# Patient Record
Sex: MALE | Race: WHITE | Employment: OTHER | URBAN - NONMETROPOLITAN AREA
[De-identification: names, ages, dates, MRNs, and addresses within clinical notes are randomized per-mention and may not be internally consistent; named-entity substitution may affect disease eponyms.]

---

## 2019-09-24 ENCOUNTER — OFFICE VISIT (OUTPATIENT)
Dept: PRIMARY CARE CLINIC | Age: 69
End: 2019-09-24
Payer: MEDICARE

## 2019-09-24 ENCOUNTER — HOSPITAL ENCOUNTER (OUTPATIENT)
Age: 69
Setting detail: SPECIMEN
Discharge: HOME | End: 2019-09-24
Payer: MEDICARE

## 2019-09-24 VITALS
WEIGHT: 229 LBS | DIASTOLIC BLOOD PRESSURE: 74 MMHG | SYSTOLIC BLOOD PRESSURE: 124 MMHG | OXYGEN SATURATION: 98 % | TEMPERATURE: 98 F | HEART RATE: 64 BPM

## 2019-09-24 DIAGNOSIS — A49.9 UTI (URINARY TRACT INFECTION), BACTERIAL: Primary | ICD-10-CM

## 2019-09-24 DIAGNOSIS — R30.0 DYSURIA: ICD-10-CM

## 2019-09-24 DIAGNOSIS — N39.0 UTI (URINARY TRACT INFECTION), BACTERIAL: Primary | ICD-10-CM

## 2019-09-24 PROBLEM — Z72.0 TOBACCO USE: Status: ACTIVE | Noted: 2019-09-24

## 2019-09-24 LAB
-: ABNORMAL
AMORPHOUS: ABNORMAL
BACTERIA: ABNORMAL
BILIRUBIN URINE: NEGATIVE
CASTS UA: ABNORMAL /LPF (ref 0–2)
COLOR: ABNORMAL
COMMENT UA: ABNORMAL
CRYSTALS, UA: ABNORMAL /HPF
EPITHELIAL CELLS UA: ABNORMAL /HPF (ref 0–5)
GLUCOSE URINE: NEGATIVE
KETONES, URINE: ABNORMAL
LEUKOCYTE ESTERASE, URINE: ABNORMAL
MUCUS: ABNORMAL
NITRITE, URINE: POSITIVE
OTHER OBSERVATIONS UA: ABNORMAL
PH UA: 6.5 (ref 5–6)
PROTEIN UA: NEGATIVE
RBC UA: ABNORMAL /HPF (ref 0–4)
RENAL EPITHELIAL, UA: ABNORMAL /HPF
SPECIFIC GRAVITY UA: 1.01 (ref 1.01–1.02)
TRICHOMONAS: ABNORMAL
TURBIDITY: ABNORMAL
URINE HGB: NEGATIVE
UROBILINOGEN, URINE: NORMAL
WBC UA: >50 /HPF (ref 0–4)
YEAST: ABNORMAL

## 2019-09-24 PROCEDURE — 87077 CULTURE AEROBIC IDENTIFY: CPT

## 2019-09-24 PROCEDURE — 81001 URINALYSIS AUTO W/SCOPE: CPT

## 2019-09-24 PROCEDURE — 99203 OFFICE O/P NEW LOW 30 MIN: CPT | Performed by: FAMILY MEDICINE

## 2019-09-24 PROCEDURE — 87086 URINE CULTURE/COLONY COUNT: CPT

## 2019-09-24 RX ORDER — CIPROFLOXACIN 500 MG/1
500 TABLET, FILM COATED ORAL 2 TIMES DAILY
Qty: 14 TABLET | Refills: 0 | Status: SHIPPED | OUTPATIENT
Start: 2019-09-24 | End: 2019-09-27

## 2019-09-24 NOTE — PROGRESS NOTES
09/24/2019 POSITIVE* NEGATIVE Final    Leukocyte Esterase, Urine 09/24/2019 2+* NEGATIVE Final    Urinalysis Comments 09/24/2019 NOT REPORTED   Final    - 09/24/2019        Final    WBC, UA 09/24/2019 >50  0 - 4 /HPF Final    RBC, UA 09/24/2019 0 TO 4  0 - 4 /HPF Final    Casts UA 09/24/2019 NOT REPORTED  0 - 2 /LPF Final    Crystals UA 09/24/2019 NOT REPORTED  None /HPF Final    Epithelial Cells UA 09/24/2019 0 TO 4  0 - 5 /HPF Final    Renal Epithelial, Urine 09/24/2019 NOT REPORTED  0 /HPF Final    Bacteria, UA 09/24/2019 3+* None Final    Mucus, UA 09/24/2019 1+* None Final    Trichomonas, UA 09/24/2019 NOT REPORTED  None Final    Amorphous, UA 09/24/2019 2+* None Final    Other Observations UA 09/24/2019 Specimen Cultured* NOT REQ. Final    Yeast, UA 09/24/2019 NOT REPORTED  None Final       Assessment and Plan:    UTI (urinary tract infection), bacterial  - ciprofloxacin (CIPRO) 500 MG tablet; Take 1 tablet by mouth 2 times daily for 7 days  Dispense: 14 tablet; Refill: 0     He will be contacted when the ID and sensitivity results are available. Printed information regarding Urinary Tract Infections in Men was provided to the patient with his after visit summary. He was advised to follow up if symptoms worsen or do not resolve.        (Please note that portions of this note were completed with a voice-recognition program. Efforts were made to edit the dictation but occasionally words are mis-transcribed.)

## 2019-09-24 NOTE — PATIENT INSTRUCTIONS
Patient Education        Urinary Tract Infections in Men: Care Instructions  Your Care Instructions    A urinary tract infection, or UTI, is a general term for an infection anywhere between the kidneys and the tip of the penis. UTIs can also be a result of a prostate problem. Most cause pain or burning when you urinate. Most UTIs are caused by bacteria and can be cured with antibiotics. It is important to complete your treatment so that the infection does not get worse. Follow-up care is a key part of your treatment and safety. Be sure to make and go to all appointments, and call your doctor if you are having problems. It's also a good idea to know your test results and keep a list of the medicines you take. How can you care for yourself at home? · Take your antibiotics as prescribed. Do not stop taking them just because you feel better. You need to take the full course of antibiotics. · Take your medicines exactly as prescribed. Your doctor may have prescribed a medicine, such as phenazopyridine (Pyridium), to help relieve pain when you urinate. This turns your urine orange. You may stop taking it when your symptoms get better. But be sure to take all of your antibiotics, which treat the infection. · Drink extra water for the next day or two. This will help make the urine less concentrated and help wash out the bacteria causing the infection. (If you have kidney, heart, or liver disease and have to limit your fluids, talk with your doctor before you increase your fluid intake.)  · Avoid drinks that are carbonated or have caffeine. They can irritate the bladder. · Urinate often. Try to empty your bladder each time. · To relieve pain, take a hot bath or lay a heating pad (set on low) over your lower belly or genital area. Never go to sleep with a heating pad in place. To help prevent UTIs  · Drink plenty of fluids, enough so that your urine is light yellow or clear like water.  If you have kidney, heart, or

## 2019-09-26 LAB
CULTURE: ABNORMAL
Lab: ABNORMAL
SPECIMEN DESCRIPTION: ABNORMAL

## 2019-09-27 RX ORDER — AMOXICILLIN 500 MG/1
500 CAPSULE ORAL 3 TIMES DAILY
Qty: 21 CAPSULE | Refills: 0 | Status: SHIPPED | OUTPATIENT
Start: 2019-09-27 | End: 2019-10-04

## 2019-10-11 ENCOUNTER — OFFICE VISIT (OUTPATIENT)
Dept: PRIMARY CARE CLINIC | Age: 69
End: 2019-10-11
Payer: MEDICARE

## 2019-10-11 VITALS
DIASTOLIC BLOOD PRESSURE: 80 MMHG | TEMPERATURE: 97.3 F | HEIGHT: 72 IN | WEIGHT: 233 LBS | SYSTOLIC BLOOD PRESSURE: 110 MMHG | OXYGEN SATURATION: 97 % | HEART RATE: 60 BPM | BODY MASS INDEX: 31.56 KG/M2

## 2019-10-11 DIAGNOSIS — J44.1 COPD EXACERBATION (HCC): Primary | ICD-10-CM

## 2019-10-11 PROCEDURE — 99214 OFFICE O/P EST MOD 30 MIN: CPT | Performed by: FAMILY MEDICINE

## 2019-10-11 RX ORDER — DOXYCYCLINE HYCLATE 100 MG
100 TABLET ORAL 2 TIMES DAILY
Qty: 20 TABLET | Refills: 0 | Status: SHIPPED | OUTPATIENT
Start: 2019-10-11 | End: 2019-12-20

## 2019-10-11 RX ORDER — PREDNISONE 20 MG/1
20 TABLET ORAL 2 TIMES DAILY
Qty: 10 TABLET | Refills: 0 | Status: SHIPPED | OUTPATIENT
Start: 2019-10-11 | End: 2019-12-20

## 2019-10-11 ASSESSMENT — ENCOUNTER SYMPTOMS
WHEEZING: 1
DIARRHEA: 0
COUGH: 1
SORE THROAT: 1
NAUSEA: 0
SINUS PRESSURE: 0
SINUS PAIN: 1
SHORTNESS OF BREATH: 0
ABDOMINAL PAIN: 0

## 2019-12-20 ENCOUNTER — OFFICE VISIT (OUTPATIENT)
Dept: PRIMARY CARE CLINIC | Age: 69
End: 2019-12-20
Payer: MEDICARE

## 2019-12-20 ENCOUNTER — HOSPITAL ENCOUNTER (OUTPATIENT)
Age: 69
Setting detail: SPECIMEN
Discharge: HOME | End: 2019-12-20
Payer: MEDICARE

## 2019-12-20 ENCOUNTER — HOSPITAL ENCOUNTER (OUTPATIENT)
Dept: LAB | Age: 69
Discharge: HOME | End: 2019-12-20
Payer: MEDICARE

## 2019-12-20 ENCOUNTER — TELEPHONE (OUTPATIENT)
Dept: PRIMARY CARE CLINIC | Age: 69
End: 2019-12-20

## 2019-12-20 VITALS
SYSTOLIC BLOOD PRESSURE: 122 MMHG | HEART RATE: 56 BPM | BODY MASS INDEX: 31.69 KG/M2 | WEIGHT: 234 LBS | HEIGHT: 72 IN | DIASTOLIC BLOOD PRESSURE: 78 MMHG | OXYGEN SATURATION: 99 % | RESPIRATION RATE: 14 BRPM

## 2019-12-20 DIAGNOSIS — R35.0 URINARY FREQUENCY: Primary | ICD-10-CM

## 2019-12-20 DIAGNOSIS — R35.0 URINARY FREQUENCY: ICD-10-CM

## 2019-12-20 DIAGNOSIS — R39.9 URINARY SYMPTOM OR SIGN: ICD-10-CM

## 2019-12-20 DIAGNOSIS — N30.00 ACUTE CYSTITIS WITHOUT HEMATURIA: ICD-10-CM

## 2019-12-20 DIAGNOSIS — Z12.5 SCREENING FOR PROSTATE CANCER: ICD-10-CM

## 2019-12-20 LAB
-: ABNORMAL
AMORPHOUS: ABNORMAL
BACTERIA: ABNORMAL
BILIRUBIN URINE: NEGATIVE
CASTS UA: ABNORMAL /LPF (ref 0–2)
COLOR: ABNORMAL
COMMENT UA: ABNORMAL
CRYSTALS, UA: ABNORMAL /HPF
EPITHELIAL CELLS UA: ABNORMAL /HPF (ref 0–5)
GLUCOSE URINE: NEGATIVE
KETONES, URINE: NEGATIVE
LEUKOCYTE ESTERASE, URINE: ABNORMAL
MUCUS: ABNORMAL
NITRITE, URINE: NEGATIVE
OTHER OBSERVATIONS UA: ABNORMAL
PH UA: 6 (ref 5–6)
PROSTATE SPECIFIC ANTIGEN: 0.08 UG/L
PROTEIN UA: NEGATIVE
RBC UA: ABNORMAL /HPF (ref 0–4)
RENAL EPITHELIAL, UA: ABNORMAL /HPF
SPECIFIC GRAVITY UA: 1.03 (ref 1.01–1.02)
TRICHOMONAS: ABNORMAL
TURBIDITY: ABNORMAL
URINE HGB: NEGATIVE
UROBILINOGEN, URINE: NORMAL
WBC UA: >100 /HPF (ref 0–4)
YEAST: ABNORMAL

## 2019-12-20 PROCEDURE — 99213 OFFICE O/P EST LOW 20 MIN: CPT | Performed by: PHYSICIAN ASSISTANT

## 2019-12-20 PROCEDURE — 87077 CULTURE AEROBIC IDENTIFY: CPT

## 2019-12-20 PROCEDURE — G0103 PSA SCREENING: HCPCS

## 2019-12-20 PROCEDURE — 36415 COLL VENOUS BLD VENIPUNCTURE: CPT

## 2019-12-20 PROCEDURE — 81001 URINALYSIS AUTO W/SCOPE: CPT

## 2019-12-20 PROCEDURE — 87086 URINE CULTURE/COLONY COUNT: CPT

## 2019-12-20 RX ORDER — CIPROFLOXACIN 500 MG/1
500 TABLET, FILM COATED ORAL 2 TIMES DAILY
Qty: 10 TABLET | Refills: 0 | Status: SHIPPED | OUTPATIENT
Start: 2019-12-20 | End: 2020-01-08

## 2019-12-20 ASSESSMENT — ENCOUNTER SYMPTOMS
DIARRHEA: 0
BACK PAIN: 1
NAUSEA: 1

## 2019-12-20 ASSESSMENT — PATIENT HEALTH QUESTIONNAIRE - PHQ9
2. FEELING DOWN, DEPRESSED OR HOPELESS: 0
1. LITTLE INTEREST OR PLEASURE IN DOING THINGS: 0
SUM OF ALL RESPONSES TO PHQ9 QUESTIONS 1 & 2: 0
SUM OF ALL RESPONSES TO PHQ QUESTIONS 1-9: 0
SUM OF ALL RESPONSES TO PHQ QUESTIONS 1-9: 0

## 2019-12-22 LAB
CULTURE: ABNORMAL
Lab: ABNORMAL
SPECIMEN DESCRIPTION: ABNORMAL

## 2019-12-23 DIAGNOSIS — N30.00 ACUTE CYSTITIS WITHOUT HEMATURIA: Primary | ICD-10-CM

## 2019-12-23 RX ORDER — NITROFURANTOIN 25; 75 MG/1; MG/1
100 CAPSULE ORAL 2 TIMES DAILY
Qty: 20 CAPSULE | Refills: 0 | Status: SHIPPED | OUTPATIENT
Start: 2019-12-23 | End: 2020-01-02

## 2020-01-01 ENCOUNTER — TELEPHONE (OUTPATIENT)
Dept: SLEEP CENTER | Age: 70
End: 2020-01-01

## 2020-01-01 ENCOUNTER — HOSPITAL ENCOUNTER (OUTPATIENT)
Dept: PHYSICAL THERAPY | Age: 70
Setting detail: THERAPIES SERIES
Discharge: HOME | End: 2020-03-03
Payer: MEDICARE

## 2020-01-01 ENCOUNTER — OFFICE VISIT (OUTPATIENT)
Dept: INTERNAL MEDICINE | Age: 70
End: 2020-01-01
Payer: MEDICARE

## 2020-01-01 ENCOUNTER — TELEPHONE (OUTPATIENT)
Dept: INTERNAL MEDICINE | Age: 70
End: 2020-01-01

## 2020-01-01 ENCOUNTER — HOSPITAL ENCOUNTER (OUTPATIENT)
Dept: PHYSICAL THERAPY | Age: 70
Setting detail: THERAPIES SERIES
Discharge: HOME | End: 2020-03-10
Payer: MEDICARE

## 2020-01-01 ENCOUNTER — HOSPITAL ENCOUNTER (OUTPATIENT)
Dept: LAB | Age: 70
Discharge: HOME | End: 2020-03-12
Payer: MEDICARE

## 2020-01-01 ENCOUNTER — HOSPITAL ENCOUNTER (OUTPATIENT)
Dept: GENERAL RADIOLOGY | Age: 70
Discharge: HOME | End: 2020-03-01
Payer: MEDICARE

## 2020-01-01 ENCOUNTER — TELEPHONE (OUTPATIENT)
Dept: SURGERY | Age: 70
End: 2020-01-01

## 2020-01-01 ENCOUNTER — HOSPITAL ENCOUNTER (OUTPATIENT)
Dept: PHYSICAL THERAPY | Age: 70
Setting detail: THERAPIES SERIES
Discharge: HOME | End: 2020-03-12
Payer: MEDICARE

## 2020-01-01 ENCOUNTER — HOSPITAL ENCOUNTER (OUTPATIENT)
Dept: PHYSICAL THERAPY | Age: 70
Setting detail: THERAPIES SERIES
Discharge: HOME | End: 2020-03-06
Payer: MEDICARE

## 2020-01-01 ENCOUNTER — HOSPITAL ENCOUNTER (OUTPATIENT)
Dept: PHYSICAL THERAPY | Age: 70
Setting detail: THERAPIES SERIES
Discharge: HOME | End: 2020-03-19
Payer: MEDICARE

## 2020-01-01 ENCOUNTER — TELEPHONE (OUTPATIENT)
Dept: PHYSICAL THERAPY | Age: 70
End: 2020-01-01

## 2020-01-01 ENCOUNTER — OFFICE VISIT (OUTPATIENT)
Dept: UROLOGY | Age: 70
End: 2020-01-01
Payer: MEDICARE

## 2020-01-01 ENCOUNTER — ANESTHESIA (OUTPATIENT)
Dept: OPERATING ROOM | Age: 70
End: 2020-01-01
Payer: MEDICARE

## 2020-01-01 ENCOUNTER — HOSPITAL ENCOUNTER (OUTPATIENT)
Dept: MRI IMAGING | Age: 70
Discharge: HOME | End: 2020-03-06
Payer: MEDICARE

## 2020-01-01 ENCOUNTER — TELEPHONE (OUTPATIENT)
Dept: PAIN MANAGEMENT | Age: 70
End: 2020-01-01

## 2020-01-01 ENCOUNTER — TELEPHONE (OUTPATIENT)
Dept: PHARMACY | Facility: CLINIC | Age: 70
End: 2020-01-01

## 2020-01-01 ENCOUNTER — OFFICE VISIT (OUTPATIENT)
Dept: ORTHOPEDIC SURGERY | Age: 70
End: 2020-01-01
Payer: MEDICARE

## 2020-01-01 ENCOUNTER — OFFICE VISIT (OUTPATIENT)
Dept: PAIN MANAGEMENT | Age: 70
End: 2020-01-01
Payer: MEDICARE

## 2020-01-01 ENCOUNTER — APPOINTMENT (OUTPATIENT)
Dept: PHYSICAL THERAPY | Age: 70
End: 2020-01-01
Payer: MEDICARE

## 2020-01-01 ENCOUNTER — OFFICE VISIT (OUTPATIENT)
Dept: CARDIOLOGY | Age: 70
End: 2020-01-01
Payer: MEDICARE

## 2020-01-01 ENCOUNTER — HOSPITAL ENCOUNTER (OUTPATIENT)
Dept: PHYSICAL THERAPY | Age: 70
Setting detail: THERAPIES SERIES
Discharge: HOME | End: 2020-03-16
Payer: MEDICARE

## 2020-01-01 ENCOUNTER — ANESTHESIA EVENT (OUTPATIENT)
Dept: OPERATING ROOM | Age: 70
End: 2020-01-01
Payer: MEDICARE

## 2020-01-01 ENCOUNTER — HOSPITAL ENCOUNTER (OUTPATIENT)
Age: 70
Setting detail: OUTPATIENT SURGERY
Discharge: HOME | End: 2020-03-18
Attending: SURGERY | Admitting: SURGERY
Payer: MEDICARE

## 2020-01-01 VITALS
HEART RATE: 78 BPM | TEMPERATURE: 97.1 F | DIASTOLIC BLOOD PRESSURE: 76 MMHG | SYSTOLIC BLOOD PRESSURE: 116 MMHG | RESPIRATION RATE: 16 BRPM | BODY MASS INDEX: 29.12 KG/M2 | HEIGHT: 72 IN | WEIGHT: 215 LBS

## 2020-01-01 VITALS
DIASTOLIC BLOOD PRESSURE: 68 MMHG | RESPIRATION RATE: 16 BRPM | BODY MASS INDEX: 29.42 KG/M2 | HEART RATE: 76 BPM | SYSTOLIC BLOOD PRESSURE: 124 MMHG | WEIGHT: 217.2 LBS | HEIGHT: 72 IN

## 2020-01-01 VITALS
HEIGHT: 72 IN | DIASTOLIC BLOOD PRESSURE: 80 MMHG | WEIGHT: 218 LBS | BODY MASS INDEX: 29.53 KG/M2 | SYSTOLIC BLOOD PRESSURE: 132 MMHG

## 2020-01-01 VITALS
HEART RATE: 77 BPM | HEIGHT: 72 IN | BODY MASS INDEX: 29.47 KG/M2 | SYSTOLIC BLOOD PRESSURE: 112 MMHG | TEMPERATURE: 98.2 F | OXYGEN SATURATION: 97 % | DIASTOLIC BLOOD PRESSURE: 80 MMHG | WEIGHT: 217.6 LBS

## 2020-01-01 VITALS
SYSTOLIC BLOOD PRESSURE: 110 MMHG | TEMPERATURE: 96.8 F | RESPIRATION RATE: 18 BRPM | DIASTOLIC BLOOD PRESSURE: 70 MMHG | BODY MASS INDEX: 29.5 KG/M2 | OXYGEN SATURATION: 98 % | HEART RATE: 68 BPM | HEIGHT: 72 IN | WEIGHT: 217.8 LBS

## 2020-01-01 VITALS — DIASTOLIC BLOOD PRESSURE: 55 MMHG | SYSTOLIC BLOOD PRESSURE: 107 MMHG | TEMPERATURE: 96.3 F | OXYGEN SATURATION: 100 %

## 2020-01-01 VITALS
BODY MASS INDEX: 29.53 KG/M2 | HEIGHT: 72 IN | DIASTOLIC BLOOD PRESSURE: 70 MMHG | WEIGHT: 218 LBS | HEART RATE: 78 BPM | SYSTOLIC BLOOD PRESSURE: 118 MMHG

## 2020-01-01 VITALS
HEIGHT: 72 IN | BODY MASS INDEX: 29.38 KG/M2 | DIASTOLIC BLOOD PRESSURE: 64 MMHG | HEART RATE: 77 BPM | WEIGHT: 216.93 LBS | SYSTOLIC BLOOD PRESSURE: 118 MMHG

## 2020-01-01 VITALS
WEIGHT: 217 LBS | DIASTOLIC BLOOD PRESSURE: 81 MMHG | HEIGHT: 72 IN | SYSTOLIC BLOOD PRESSURE: 129 MMHG | HEART RATE: 75 BPM | BODY MASS INDEX: 29.39 KG/M2

## 2020-01-01 VITALS
DIASTOLIC BLOOD PRESSURE: 64 MMHG | HEART RATE: 80 BPM | SYSTOLIC BLOOD PRESSURE: 104 MMHG | TEMPERATURE: 97.6 F | WEIGHT: 222 LBS | HEIGHT: 72 IN | BODY MASS INDEX: 30.07 KG/M2

## 2020-01-01 LAB
ABSOLUTE EOS #: 0.55 K/UL (ref 0–0.44)
ABSOLUTE IMMATURE GRANULOCYTE: 3.59 K/UL (ref 0–0.3)
ABSOLUTE LYMPH #: 2.76 K/UL (ref 1.1–3.7)
ABSOLUTE MONO #: 0.69 K/UL (ref 0.1–1.2)
ABSOLUTE RETIC #: 0.03 M/UL (ref 0.02–0.1)
BASOPHILS # BLD: 2 % (ref 0–2)
BASOPHILS ABSOLUTE: 0.28 K/UL (ref 0–0.2)
DIFFERENTIAL TYPE: ABNORMAL
EOSINOPHILS RELATIVE PERCENT: 4 % (ref 1–4)
FERRITIN: 1053 UG/L (ref 30–400)
HCT VFR BLD CALC: 28.6 % (ref 40.7–50.3)
HCT VFR BLD CALC: 29.8 % (ref 40.7–50.3)
HEMOGLOBIN: 8.4 G/DL (ref 13–17)
IMMATURE GRANULOCYTES: 26 %
IMMATURE RETIC FRACT: 33.2 % (ref 2.7–18.3)
IRON SATURATION: 42 % (ref 20–55)
IRON: 98 UG/DL (ref 59–158)
LYMPHOCYTES # BLD: 20 % (ref 24–43)
MCH RBC QN AUTO: 22 PG (ref 25.2–33.5)
MCHC RBC AUTO-ENTMCNC: 28.2 G/DL (ref 25.2–33.5)
MCV RBC AUTO: 78.2 FL (ref 82.6–102.9)
MONOCYTES # BLD: 5 % (ref 3–12)
MORPHOLOGY: ABNORMAL
NRBC AUTOMATED: 2 PER 100 WBC
PDW BLD-RTO: 37.4 % (ref 11.8–14.4)
PLATELET # BLD: ABNORMAL K/UL (ref 138–453)
PLATELET ESTIMATE: ABNORMAL
PLATELET, FLUORESCENCE: 199 K/UL (ref 138–453)
PLATELET, IMMATURE FRACTION: 14.2 % (ref 1.1–10.3)
PMV BLD AUTO: ABNORMAL FL (ref 8.1–13.5)
RBC # BLD: 3.81 M/UL (ref 4.21–5.77)
RBC # BLD: ABNORMAL 10*6/UL
RBC FOLATE: 558 NG/ML (ref 280–903)
RETIC %: 0.9 % (ref 0.5–2)
RETIC HEMOGLOBIN: 25.2 PG (ref 28.2–35.7)
SEG NEUTROPHILS: 43 % (ref 36–65)
SEGMENTED NEUTROPHILS ABSOLUTE COUNT: 5.93 K/UL (ref 1.5–8.1)
SURGICAL PATHOLOGY REPORT: NORMAL
TOTAL IRON BINDING CAPACITY: 233 UG/DL (ref 250–450)
UNSATURATED IRON BINDING CAPACITY: 135 UG/DL (ref 112–347)
VITAMIN B-12: 795 PG/ML (ref 232–1245)
WBC # BLD: 13.8 K/UL (ref 3.5–11.3)
WBC # BLD: ABNORMAL 10*3/UL

## 2020-01-01 PROCEDURE — 85055 RETICULATED PLATELET ASSAY: CPT

## 2020-01-01 PROCEDURE — 99213 OFFICE O/P EST LOW 20 MIN: CPT

## 2020-01-01 PROCEDURE — 7100000011 HC PHASE II RECOVERY - ADDTL 15 MIN: Performed by: SURGERY

## 2020-01-01 PROCEDURE — 82607 VITAMIN B-12: CPT

## 2020-01-01 PROCEDURE — 51798 US URINE CAPACITY MEASURE: CPT | Performed by: UROLOGY

## 2020-01-01 PROCEDURE — 82728 ASSAY OF FERRITIN: CPT

## 2020-01-01 PROCEDURE — 99215 OFFICE O/P EST HI 40 MIN: CPT | Performed by: PHYSICAL MEDICINE & REHABILITATION

## 2020-01-01 PROCEDURE — 97110 THERAPEUTIC EXERCISES: CPT

## 2020-01-01 PROCEDURE — 99204 OFFICE O/P NEW MOD 45 MIN: CPT | Performed by: PHYSICAL MEDICINE & REHABILITATION

## 2020-01-01 PROCEDURE — 97110 THERAPEUTIC EXERCISES: CPT | Performed by: PHYSICAL THERAPIST

## 2020-01-01 PROCEDURE — 6360000002 HC RX W HCPCS: Performed by: NURSE ANESTHETIST, CERTIFIED REGISTERED

## 2020-01-01 PROCEDURE — 72100 X-RAY EXAM L-S SPINE 2/3 VWS: CPT

## 2020-01-01 PROCEDURE — 97161 PT EVAL LOW COMPLEX 20 MIN: CPT | Performed by: PHYSICAL THERAPIST

## 2020-01-01 PROCEDURE — 99213 OFFICE O/P EST LOW 20 MIN: CPT | Performed by: INTERNAL MEDICINE

## 2020-01-01 PROCEDURE — 72148 MRI LUMBAR SPINE W/O DYE: CPT

## 2020-01-01 PROCEDURE — 99214 OFFICE O/P EST MOD 30 MIN: CPT | Performed by: UROLOGY

## 2020-01-01 PROCEDURE — 36415 COLL VENOUS BLD VENIPUNCTURE: CPT

## 2020-01-01 PROCEDURE — 99213 OFFICE O/P EST LOW 20 MIN: CPT | Performed by: UROLOGY

## 2020-01-01 PROCEDURE — 1111F DSCHRG MED/CURRENT MED MERGE: CPT | Performed by: INTERNAL MEDICINE

## 2020-01-01 PROCEDURE — 99203 OFFICE O/P NEW LOW 30 MIN: CPT | Performed by: ORTHOPAEDIC SURGERY

## 2020-01-01 PROCEDURE — 3700000001 HC ADD 15 MINUTES (ANESTHESIA): Performed by: SURGERY

## 2020-01-01 PROCEDURE — 3700000000 HC ANESTHESIA ATTENDED CARE: Performed by: SURGERY

## 2020-01-01 PROCEDURE — 99214 OFFICE O/P EST MOD 30 MIN: CPT | Performed by: INTERNAL MEDICINE

## 2020-01-01 PROCEDURE — 83550 IRON BINDING TEST: CPT

## 2020-01-01 PROCEDURE — 7100000010 HC PHASE II RECOVERY - FIRST 15 MIN: Performed by: SURGERY

## 2020-01-01 PROCEDURE — G0283 ELEC STIM OTHER THAN WOUND: HCPCS | Performed by: PHYSICAL THERAPIST

## 2020-01-01 PROCEDURE — 99214 OFFICE O/P EST MOD 30 MIN: CPT

## 2020-01-01 PROCEDURE — G0283 ELEC STIM OTHER THAN WOUND: HCPCS

## 2020-01-01 PROCEDURE — 99214 OFFICE O/P EST MOD 30 MIN: CPT | Performed by: ORTHOPAEDIC SURGERY

## 2020-01-01 PROCEDURE — 2709999900 HC NON-CHARGEABLE SUPPLY: Performed by: SURGERY

## 2020-01-01 PROCEDURE — 85025 COMPLETE CBC W/AUTO DIFF WBC: CPT

## 2020-01-01 PROCEDURE — 2580000003 HC RX 258: Performed by: SURGERY

## 2020-01-01 PROCEDURE — 82747 ASSAY OF FOLIC ACID RBC: CPT

## 2020-01-01 PROCEDURE — 2500000003 HC RX 250 WO HCPCS: Performed by: NURSE ANESTHETIST, CERTIFIED REGISTERED

## 2020-01-01 PROCEDURE — 88305 TISSUE EXAM BY PATHOLOGIST: CPT

## 2020-01-01 PROCEDURE — 3609010400 HC COLONOSCOPY POLYPECTOMY HOT BIOPSY: Performed by: SURGERY

## 2020-01-01 PROCEDURE — 85045 AUTOMATED RETICULOCYTE COUNT: CPT

## 2020-01-01 PROCEDURE — 45385 COLONOSCOPY W/LESION REMOVAL: CPT | Performed by: SURGERY

## 2020-01-01 PROCEDURE — 45380 COLONOSCOPY AND BIOPSY: CPT | Performed by: SURGERY

## 2020-01-01 PROCEDURE — 99495 TRANSJ CARE MGMT MOD F2F 14D: CPT | Performed by: INTERNAL MEDICINE

## 2020-01-01 PROCEDURE — 99213 OFFICE O/P EST LOW 20 MIN: CPT | Performed by: ORTHOPAEDIC SURGERY

## 2020-01-01 PROCEDURE — 45381 COLONOSCOPY SUBMUCOUS NJX: CPT | Performed by: SURGERY

## 2020-01-01 PROCEDURE — 83540 ASSAY OF IRON: CPT

## 2020-01-01 RX ORDER — PROPOFOL 10 MG/ML
INJECTION, EMULSION INTRAVENOUS PRN
Status: DISCONTINUED | OUTPATIENT
Start: 2020-01-01 | End: 2020-01-01 | Stop reason: SDUPTHER

## 2020-01-01 RX ORDER — LACTULOSE 10 G/15ML
SOLUTION ORAL
Qty: 1 BOTTLE | Refills: 1 | Status: SHIPPED | OUTPATIENT
Start: 2020-01-01

## 2020-01-01 RX ORDER — GLYCOPYRROLATE 0.2 MG/ML
INJECTION INTRAMUSCULAR; INTRAVENOUS PRN
Status: DISCONTINUED | OUTPATIENT
Start: 2020-01-01 | End: 2020-01-01 | Stop reason: SDUPTHER

## 2020-01-01 RX ORDER — TRAMADOL HYDROCHLORIDE 50 MG/1
50 TABLET ORAL EVERY 6 HOURS PRN
Qty: 10 TABLET | Refills: 0 | Status: SHIPPED | OUTPATIENT
Start: 2020-01-01 | End: 2020-01-01

## 2020-01-01 RX ORDER — CEFOXITIN SODIUM 2 G/50ML
INJECTION, SOLUTION INTRAVENOUS PRN
Status: DISCONTINUED | OUTPATIENT
Start: 2020-01-01 | End: 2020-01-01 | Stop reason: SDUPTHER

## 2020-01-01 RX ORDER — FERROUS SULFATE 325(65) MG
TABLET ORAL
Qty: 180 TABLET | Refills: 3 | Status: SHIPPED | OUTPATIENT
Start: 2020-01-01

## 2020-01-01 RX ORDER — LACTULOSE 10 G/15ML
SOLUTION ORAL
Qty: 1 BOTTLE | Refills: 1 | Status: SHIPPED | OUTPATIENT
Start: 2020-01-01 | End: 2020-01-01 | Stop reason: SDUPTHER

## 2020-01-01 RX ORDER — TRAMADOL HYDROCHLORIDE 50 MG/1
50 TABLET ORAL EVERY 6 HOURS PRN
COMMUNITY

## 2020-01-01 RX ORDER — FERROUS SULFATE 325(65) MG
TABLET ORAL
Qty: 60 TABLET | Refills: 0 | Status: SHIPPED | OUTPATIENT
Start: 2020-01-01 | End: 2020-01-01

## 2020-01-01 RX ORDER — TAMSULOSIN HYDROCHLORIDE 0.4 MG/1
0.4 CAPSULE ORAL DAILY
Qty: 90 CAPSULE | Refills: 3 | Status: SHIPPED | OUTPATIENT
Start: 2020-01-01

## 2020-01-01 RX ORDER — TRAMADOL HYDROCHLORIDE 50 MG/1
50 TABLET ORAL EVERY 6 HOURS PRN
Qty: 28 TABLET | Refills: 0 | Status: SHIPPED | OUTPATIENT
Start: 2020-01-01 | End: 2020-01-01

## 2020-01-01 RX ORDER — SODIUM CHLORIDE 0.9 % (FLUSH) 0.9 %
10 SYRINGE (ML) INJECTION PRN
Status: DISCONTINUED | OUTPATIENT
Start: 2020-01-01 | End: 2020-01-01 | Stop reason: HOSPADM

## 2020-01-01 RX ORDER — SODIUM CHLORIDE, SODIUM LACTATE, POTASSIUM CHLORIDE, CALCIUM CHLORIDE 600; 310; 30; 20 MG/100ML; MG/100ML; MG/100ML; MG/100ML
INJECTION, SOLUTION INTRAVENOUS CONTINUOUS
Status: DISCONTINUED | OUTPATIENT
Start: 2020-01-01 | End: 2020-01-01 | Stop reason: HOSPADM

## 2020-01-01 RX ORDER — FUROSEMIDE 20 MG/1
20 TABLET ORAL DAILY PRN
Qty: 90 TABLET | Refills: 1 | Status: SHIPPED | OUTPATIENT
Start: 2020-01-01 | End: 2020-01-01

## 2020-01-01 RX ORDER — SODIUM CHLORIDE 0.9 % (FLUSH) 0.9 %
10 SYRINGE (ML) INJECTION EVERY 12 HOURS SCHEDULED
Status: DISCONTINUED | OUTPATIENT
Start: 2020-01-01 | End: 2020-01-01 | Stop reason: HOSPADM

## 2020-01-01 RX ORDER — TRAMADOL HYDROCHLORIDE 50 MG/1
50 TABLET ORAL EVERY 6 HOURS PRN
Qty: 10 TABLET | Refills: 0 | Status: SHIPPED | OUTPATIENT
Start: 2020-01-01 | End: 2020-01-01 | Stop reason: SDUPTHER

## 2020-01-01 RX ORDER — FUROSEMIDE 20 MG/1
TABLET ORAL
Qty: 90 TABLET | Refills: 0 | Status: SHIPPED | OUTPATIENT
Start: 2020-01-01

## 2020-01-01 RX ADMIN — GLYCOPYRROLATE 0.2 MG: 0.2 INJECTION INTRAMUSCULAR; INTRAVENOUS at 08:03

## 2020-01-01 RX ADMIN — PHENYLEPHRINE HYDROCHLORIDE 300 MCG: 10 INJECTION INTRAVENOUS at 07:39

## 2020-01-01 RX ADMIN — PHENYLEPHRINE HYDROCHLORIDE 300 MCG: 10 INJECTION INTRAVENOUS at 08:03

## 2020-01-01 RX ADMIN — PROPOFOL 700 MG: 10 INJECTION, EMULSION INTRAVENOUS at 07:35

## 2020-01-01 RX ADMIN — GLYCOPYRROLATE 0.2 MG: 0.2 INJECTION INTRAMUSCULAR; INTRAVENOUS at 07:56

## 2020-01-01 RX ADMIN — SODIUM CHLORIDE, POTASSIUM CHLORIDE, SODIUM LACTATE AND CALCIUM CHLORIDE: 600; 310; 30; 20 INJECTION, SOLUTION INTRAVENOUS at 08:04

## 2020-01-01 RX ADMIN — PHENYLEPHRINE HYDROCHLORIDE 300 MCG: 10 INJECTION INTRAVENOUS at 07:50

## 2020-01-01 RX ADMIN — PHENYLEPHRINE HYDROCHLORIDE 300 MCG: 10 INJECTION INTRAVENOUS at 08:00

## 2020-01-01 RX ADMIN — CEFOXITIN SODIUM 2 G: 2 INJECTION, SOLUTION INTRAVENOUS at 07:33

## 2020-01-01 RX ADMIN — PHENYLEPHRINE HYDROCHLORIDE 300 MCG: 10 INJECTION INTRAVENOUS at 07:45

## 2020-01-01 RX ADMIN — SODIUM CHLORIDE, POTASSIUM CHLORIDE, SODIUM LACTATE AND CALCIUM CHLORIDE: 600; 310; 30; 20 INJECTION, SOLUTION INTRAVENOUS at 07:05

## 2020-01-01 ASSESSMENT — PAIN DESCRIPTION - PROGRESSION: CLINICAL_PROGRESSION: GRADUALLY IMPROVING

## 2020-01-01 ASSESSMENT — ENCOUNTER SYMPTOMS
BACK PAIN: 1
RESPIRATORY NEGATIVE: 1
EYES NEGATIVE: 1
NAUSEA: 0
CONSTIPATION: 0
BACK PAIN: 1
ALLERGIC/IMMUNOLOGIC NEGATIVE: 1

## 2020-01-01 ASSESSMENT — PATIENT HEALTH QUESTIONNAIRE - PHQ9
2. FEELING DOWN, DEPRESSED OR HOPELESS: 0
SUM OF ALL RESPONSES TO PHQ QUESTIONS 1-9: 0
1. LITTLE INTEREST OR PLEASURE IN DOING THINGS: 0
SUM OF ALL RESPONSES TO PHQ9 QUESTIONS 1 & 2: 0
SUM OF ALL RESPONSES TO PHQ QUESTIONS 1-9: 0

## 2020-01-01 ASSESSMENT — PAIN - FUNCTIONAL ASSESSMENT
PAIN_FUNCTIONAL_ASSESSMENT: 0-10
PAIN_FUNCTIONAL_ASSESSMENT: PREVENTS OR INTERFERES SOME ACTIVE ACTIVITIES AND ADLS

## 2020-01-01 ASSESSMENT — PAIN DESCRIPTION - ORIENTATION: ORIENTATION: RIGHT

## 2020-01-01 ASSESSMENT — PAIN DESCRIPTION - LOCATION: LOCATION: BACK;BUTTOCKS

## 2020-01-01 ASSESSMENT — PAIN DESCRIPTION - FREQUENCY: FREQUENCY: INTERMITTENT

## 2020-01-01 ASSESSMENT — PAIN DESCRIPTION - ONSET: ONSET: PROGRESSIVE

## 2020-01-01 ASSESSMENT — LIFESTYLE VARIABLES: SMOKING_STATUS: 1

## 2020-01-01 ASSESSMENT — PAIN SCALES - GENERAL
PAINLEVEL_OUTOF10: 0
PAINLEVEL_OUTOF10: 4

## 2020-01-01 ASSESSMENT — PAIN DESCRIPTION - PAIN TYPE: TYPE: ACUTE PAIN

## 2020-01-01 ASSESSMENT — PAIN DESCRIPTION - DESCRIPTORS: DESCRIPTORS: ACHING;SHARP

## 2020-01-02 ENCOUNTER — HOSPITAL ENCOUNTER (OUTPATIENT)
Dept: LAB | Age: 70
Discharge: HOME | End: 2020-01-02
Payer: MEDICARE

## 2020-01-02 ENCOUNTER — OFFICE VISIT (OUTPATIENT)
Dept: INTERNAL MEDICINE | Age: 70
End: 2020-01-02
Payer: MEDICARE

## 2020-01-02 VITALS
RESPIRATION RATE: 16 BRPM | BODY MASS INDEX: 31.23 KG/M2 | SYSTOLIC BLOOD PRESSURE: 120 MMHG | WEIGHT: 230.6 LBS | TEMPERATURE: 96.5 F | HEART RATE: 60 BPM | DIASTOLIC BLOOD PRESSURE: 72 MMHG | HEIGHT: 72 IN

## 2020-01-02 LAB
ABSOLUTE BANDS #: 0.21 K/UL
ABSOLUTE EOS #: 0.29 K/UL (ref 0–0.4)
ABSOLUTE IMMATURE GRANULOCYTE: 0 K/UL (ref 0–0.3)
ABSOLUTE LYMPH #: 1.93 K/UL (ref 1–4.8)
ABSOLUTE MONO #: 0.64 K/UL (ref 0.1–1.2)
ALBUMIN SERPL-MCNC: 4.6 G/DL (ref 3.5–5.2)
ALBUMIN/GLOBULIN RATIO: 1.8 (ref 1–2.5)
ALP BLD-CCNC: 67 U/L (ref 40–129)
ALT SERPL-CCNC: 16 U/L (ref 5–41)
ANION GAP SERPL CALCULATED.3IONS-SCNC: 13 MMOL/L (ref 9–17)
AST SERPL-CCNC: 19 U/L
BANDS: 3 %
BASOPHILS # BLD: 0 % (ref 0–2)
BASOPHILS ABSOLUTE: 0 K/UL (ref 0–0.2)
BILIRUB SERPL-MCNC: 0.35 MG/DL (ref 0.3–1.2)
BUN BLDV-MCNC: 19 MG/DL (ref 8–23)
BUN/CREAT BLD: 28 (ref 9–20)
CALCIUM SERPL-MCNC: 9.2 MG/DL (ref 8.6–10.4)
CHLORIDE BLD-SCNC: 104 MMOL/L (ref 98–107)
CHOLESTEROL/HDL RATIO: 1.6
CHOLESTEROL: 136 MG/DL
CO2: 22 MMOL/L (ref 20–31)
CREAT SERPL-MCNC: 0.68 MG/DL (ref 0.7–1.2)
DIFFERENTIAL TYPE: ABNORMAL
EOSINOPHILS RELATIVE PERCENT: 4 % (ref 1–8)
GFR AFRICAN AMERICAN: >60 ML/MIN
GFR NON-AFRICAN AMERICAN: >60 ML/MIN
GFR SERPL CREATININE-BSD FRML MDRD: ABNORMAL ML/MIN/{1.73_M2}
GFR SERPL CREATININE-BSD FRML MDRD: ABNORMAL ML/MIN/{1.73_M2}
GLUCOSE FASTING: 106 MG/DL (ref 70–99)
HCT VFR BLD CALC: 29.6 % (ref 40.7–50.3)
HDLC SERPL-MCNC: 83 MG/DL
HEMOGLOBIN: 8.8 G/DL (ref 13–17)
IMMATURE GRANULOCYTES: 0 %
LDL CHOLESTEROL: 46 MG/DL (ref 0–130)
LYMPHOCYTES # BLD: 27 % (ref 15–43)
MCH RBC QN AUTO: 25.8 PG (ref 25.2–33.5)
MCHC RBC AUTO-ENTMCNC: 29.7 G/DL (ref 25.2–33.5)
MCV RBC AUTO: 86.8 FL (ref 82.6–102.9)
METAMYELOCYTES ABSOLUTE COUNT: 0.43 K/UL
METAMYELOCYTES: 6 %
MONOCYTES # BLD: 9 % (ref 6–14)
MORPHOLOGY: ABNORMAL
MORPHOLOGY: ABNORMAL
MYELOCYTES ABSOLUTE COUNT: 0.93 K/UL
MYELOCYTES: 13 %
NRBC AUTOMATED: 3.4 PER 100 WBC
NUCLEATED RED BLOOD CELLS: 2 PER 100 WBC
PDW BLD-RTO: 38.7 % (ref 11.8–14.4)
PLATELET # BLD: 235 K/UL (ref 138–453)
PLATELET ESTIMATE: ABNORMAL
PMV BLD AUTO: ABNORMAL FL (ref 8.1–13.5)
POTASSIUM SERPL-SCNC: 4.4 MMOL/L (ref 3.7–5.3)
PROSTATE SPECIFIC ANTIGEN: 0.05 UG/L
RBC # BLD: 3.41 M/UL (ref 4.21–5.77)
RBC # BLD: ABNORMAL 10*6/UL
SEG NEUTROPHILS: 38 % (ref 44–74)
SEGMENTED NEUTROPHILS ABSOLUTE COUNT: 2.73 K/UL (ref 1.8–7.7)
SODIUM BLD-SCNC: 139 MMOL/L (ref 135–144)
TOTAL PROTEIN: 7.2 G/DL (ref 6.4–8.3)
TRIGL SERPL-MCNC: 36 MG/DL
VLDLC SERPL CALC-MCNC: NORMAL MG/DL (ref 1–30)
WBC # BLD: 7.2 K/UL (ref 3.5–11.3)
WBC # BLD: ABNORMAL 10*3/UL

## 2020-01-02 PROCEDURE — 99213 OFFICE O/P EST LOW 20 MIN: CPT | Performed by: INTERNAL MEDICINE

## 2020-01-02 PROCEDURE — 80053 COMPREHEN METABOLIC PANEL: CPT

## 2020-01-02 PROCEDURE — 99406 BEHAV CHNG SMOKING 3-10 MIN: CPT | Performed by: INTERNAL MEDICINE

## 2020-01-02 PROCEDURE — 80061 LIPID PANEL: CPT

## 2020-01-02 PROCEDURE — G0103 PSA SCREENING: HCPCS

## 2020-01-02 PROCEDURE — 85025 COMPLETE CBC W/AUTO DIFF WBC: CPT

## 2020-01-02 PROCEDURE — 36415 COLL VENOUS BLD VENIPUNCTURE: CPT

## 2020-01-02 RX ORDER — SILDENAFIL 50 MG/1
50 TABLET, FILM COATED ORAL DAILY PRN
Qty: 5 TABLET | Refills: 1 | Status: ON HOLD | OUTPATIENT
Start: 2020-01-02 | End: 2020-02-07 | Stop reason: HOSPADM

## 2020-01-06 ENCOUNTER — TELEPHONE (OUTPATIENT)
Dept: INTERNAL MEDICINE | Age: 70
End: 2020-01-06

## 2020-01-06 NOTE — TELEPHONE ENCOUNTER
Patient notified of lab results and will come in to do iron studies and referral to general surgery done.

## 2020-01-08 ENCOUNTER — TELEPHONE (OUTPATIENT)
Dept: INTERNAL MEDICINE | Age: 70
End: 2020-01-08

## 2020-01-08 ENCOUNTER — INITIAL CONSULT (OUTPATIENT)
Dept: SURGERY | Age: 70
End: 2020-01-08
Payer: MEDICARE

## 2020-01-08 VITALS
DIASTOLIC BLOOD PRESSURE: 70 MMHG | SYSTOLIC BLOOD PRESSURE: 120 MMHG | WEIGHT: 235 LBS | HEIGHT: 72 IN | TEMPERATURE: 98.2 F | HEART RATE: 72 BPM | BODY MASS INDEX: 31.83 KG/M2

## 2020-01-08 PROCEDURE — 99215 OFFICE O/P EST HI 40 MIN: CPT | Performed by: SURGERY

## 2020-01-08 PROCEDURE — 99204 OFFICE O/P NEW MOD 45 MIN: CPT | Performed by: SURGERY

## 2020-01-08 RX ORDER — POLYETHYLENE GLYCOL 3350 17 G/17G
POWDER, FOR SOLUTION ORAL
Qty: 238 G | Refills: 0 | Status: SHIPPED | OUTPATIENT
Start: 2020-01-08 | End: 2020-01-21 | Stop reason: ALTCHOICE

## 2020-01-08 RX ORDER — POLYETHYLENE GLYCOL 3350, SODIUM CHLORIDE, SODIUM BICARBONATE, POTASSIUM CHLORIDE 420; 11.2; 5.72; 1.48 G/4L; G/4L; G/4L; G/4L
4000 POWDER, FOR SOLUTION ORAL ONCE
Qty: 4000 ML | Refills: 0 | Status: SHIPPED | OUTPATIENT
Start: 2020-01-08 | End: 2020-01-08

## 2020-01-08 NOTE — PATIENT INSTRUCTIONS
EGD and colonoscopy scheduled for 1/14/20 at [facility]. Follow bowel prep instructions as given.

## 2020-01-08 NOTE — LETTER
COLONOSCOPY             Day Before Examination: 1/13/20       Morning:   Mix bowel prep according to directions and refrigerate. Only CLEAR  LIQUIDS are permitted until midnight. NO FOOD THIS DAY!! Clear liquids including any of the following:   Water   Clear broth or bouillon   7-up, Sprite or Ginger ale   Gatorade or Jeffry-Aid   Popsicles   Coffee or tea (without milk or sweetener)   Plain Jell-o   NOTHING RED OR PURPLE     At 12 noon take two (2) Dulcolax tablets     Evening: Begin drinking prep at 4:00pm. Drink an 8 ounce glass every 10 minutes. It is  best to drink the whole glass rapidly rather than sipping small amounts. Continue  drinking until the bottle is empty. Bowel movements should begin approximately one(1) hour after the first glass of prep. They will continue periodically one (1) to two (2) hours after drinking the first glass. If you experience bloating, cramping or nausea set the prep aside for 30-40 minutes, the start again. This is temporary and will disappear once bowel movements begin.

## 2020-01-08 NOTE — TELEPHONE ENCOUNTER
Patient was seen in office 01/02/2020 for UTI. He does not feel it is cleared up. Pain is not as bad, urine is still cloudy and has odor. What do you advise?   [phone]

## 2020-01-08 NOTE — PROGRESS NOTES
Patient scheduled for EGD and colonoscopy on 1/14/20 at [facility]. Patient was given instructions for Thelda [facility] and Nulytely bowel preps. He was unsure which prep he wanted to use. Pre op and bowel prep instruction for both preps given written and verbally. Patient verbalizes understanding.
Refill    bisacodyl (BISACODYL) 5 MG EC tablet Take per bowel prep instructions 2 tablet 0    polyethylene glycol-electrolytes (NULYTELY) 420 g solution Take 4,000 mLs by mouth once for 1 dose 4000 mL 0    polyethylene glycol (MIRALAX) powder Take per bowel prep instructions 238 g 0    sildenafil (VIAGRA) 50 MG tablet Take 1 tablet by mouth daily as needed for Erectile Dysfunction 5 tablet 1     No current facility-administered medications for this visit. Home Medications:   Prior to Admission medications    Medication Sig Start Date End Date Taking? Authorizing Provider   bisacodyl (BISACODYL) 5 MG EC tablet Take per bowel prep instructions 1/8/20  Yes Natan Chapman, DO   polyethylene glycol-electrolytes (NULYTELY) 420 g solution Take 4,000 mLs by mouth once for 1 dose 1/8/20 1/8/20 Yes Natan Chapman, DO   polyethylene glycol [facility]) powder Take per bowel prep instructions 1/8/20  Yes Natan Chapman, DO   sildenafil (VIAGRA) 50 MG tablet Take 1 tablet by mouth daily as needed for Erectile Dysfunction 1/2/20  Yes Avinash Dobbs MD       Allergies  Patient has no known allergies. Review of Systems:  General: Denies any fever, chills. Eyes: Denies any changes in vision, diplopia or eye pain  Ears, Nose, Mouth: Denies changes in hearing/tinnitus or drainage from ears, no rhinorrhea or bloody nose, no difficulty chewing  Throat: no difficulty swallowing, no throat pain  Respiratory: Denies any shortness of breath or cough. Cardiac: Denies any chest pain, palpitations, claudication or edema. Gastrointestinal: heartburn, abdominal pain  Genitourinary: Denies any frequency, urgency, hesitancy or incontinence. Musculoskeletal: Denies worsening muscle weakness or recent trauma  Skin: Denies rashes or lesions  Psychiatric: Denies any recent changes in mood or affect  Hematologic: Denies bruising or bleeding easily.     PHYSICAL EXAMINATION  Vitals:   Vitals:    01/08/20 1547   BP: 120/70   Pulse: 72   Temp: 98.2 °F

## 2020-01-14 ENCOUNTER — ANESTHESIA EVENT (OUTPATIENT)
Dept: OPERATING ROOM | Age: 70
End: 2020-01-14
Payer: MEDICARE

## 2020-01-14 ENCOUNTER — ANESTHESIA (OUTPATIENT)
Dept: OPERATING ROOM | Age: 70
End: 2020-01-14
Payer: MEDICARE

## 2020-01-14 ENCOUNTER — HOSPITAL ENCOUNTER (OUTPATIENT)
Age: 70
Setting detail: OUTPATIENT SURGERY
Discharge: HOME | End: 2020-01-14
Attending: SURGERY | Admitting: SURGERY
Payer: MEDICARE

## 2020-01-14 VITALS — SYSTOLIC BLOOD PRESSURE: 105 MMHG | DIASTOLIC BLOOD PRESSURE: 53 MMHG | OXYGEN SATURATION: 100 %

## 2020-01-14 VITALS
RESPIRATION RATE: 16 BRPM | TEMPERATURE: 96.9 F | HEART RATE: 54 BPM | HEIGHT: 72 IN | DIASTOLIC BLOOD PRESSURE: 58 MMHG | SYSTOLIC BLOOD PRESSURE: 120 MMHG | OXYGEN SATURATION: 100 % | BODY MASS INDEX: 30.1 KG/M2 | WEIGHT: 222.2 LBS

## 2020-01-14 PROCEDURE — 7100000010 HC PHASE II RECOVERY - FIRST 15 MIN: Performed by: SURGERY

## 2020-01-14 PROCEDURE — 43239 EGD BIOPSY SINGLE/MULTIPLE: CPT | Performed by: SURGERY

## 2020-01-14 PROCEDURE — 2580000003 HC RX 258: Performed by: SURGERY

## 2020-01-14 PROCEDURE — 3609012400 HC EGD TRANSORAL BIOPSY SINGLE/MULTIPLE: Performed by: SURGERY

## 2020-01-14 PROCEDURE — 7100000011 HC PHASE II RECOVERY - ADDTL 15 MIN: Performed by: SURGERY

## 2020-01-14 PROCEDURE — 2500000003 HC RX 250 WO HCPCS: Performed by: NURSE ANESTHETIST, CERTIFIED REGISTERED

## 2020-01-14 PROCEDURE — 88305 TISSUE EXAM BY PATHOLOGIST: CPT

## 2020-01-14 PROCEDURE — 3700000000 HC ANESTHESIA ATTENDED CARE: Performed by: SURGERY

## 2020-01-14 PROCEDURE — 6360000002 HC RX W HCPCS: Performed by: NURSE ANESTHETIST, CERTIFIED REGISTERED

## 2020-01-14 PROCEDURE — 45385 COLONOSCOPY W/LESION REMOVAL: CPT | Performed by: SURGERY

## 2020-01-14 PROCEDURE — 88342 IMHCHEM/IMCYTCHM 1ST ANTB: CPT

## 2020-01-14 PROCEDURE — 3700000001 HC ADD 15 MINUTES (ANESTHESIA): Performed by: SURGERY

## 2020-01-14 PROCEDURE — 2709999900 HC NON-CHARGEABLE SUPPLY: Performed by: SURGERY

## 2020-01-14 PROCEDURE — 3609027000 HC COLONOSCOPY: Performed by: SURGERY

## 2020-01-14 RX ORDER — SODIUM CHLORIDE 0.9 % (FLUSH) 0.9 %
10 SYRINGE (ML) INJECTION PRN
Status: DISCONTINUED | OUTPATIENT
Start: 2020-01-14 | End: 2020-01-14 | Stop reason: HOSPADM

## 2020-01-14 RX ORDER — PROPOFOL 10 MG/ML
INJECTION, EMULSION INTRAVENOUS PRN
Status: DISCONTINUED | OUTPATIENT
Start: 2020-01-14 | End: 2020-01-14 | Stop reason: SDUPTHER

## 2020-01-14 RX ORDER — SODIUM CHLORIDE 0.9 % (FLUSH) 0.9 %
10 SYRINGE (ML) INJECTION EVERY 12 HOURS SCHEDULED
Status: DISCONTINUED | OUTPATIENT
Start: 2020-01-14 | End: 2020-01-14 | Stop reason: HOSPADM

## 2020-01-14 RX ORDER — LIDOCAINE HYDROCHLORIDE 40 MG/ML
INJECTION, SOLUTION RETROBULBAR; TOPICAL PRN
Status: DISCONTINUED | OUTPATIENT
Start: 2020-01-14 | End: 2020-01-14 | Stop reason: SDUPTHER

## 2020-01-14 RX ORDER — SODIUM CHLORIDE, SODIUM LACTATE, POTASSIUM CHLORIDE, CALCIUM CHLORIDE 600; 310; 30; 20 MG/100ML; MG/100ML; MG/100ML; MG/100ML
INJECTION, SOLUTION INTRAVENOUS CONTINUOUS
Status: DISCONTINUED | OUTPATIENT
Start: 2020-01-14 | End: 2020-01-14 | Stop reason: HOSPADM

## 2020-01-14 RX ADMIN — PROPOFOL 40 MG: 10 INJECTION, EMULSION INTRAVENOUS at 10:22

## 2020-01-14 RX ADMIN — PROPOFOL 60 MG: 10 INJECTION, EMULSION INTRAVENOUS at 10:39

## 2020-01-14 RX ADMIN — PROPOFOL 50 MG: 10 INJECTION, EMULSION INTRAVENOUS at 10:11

## 2020-01-14 RX ADMIN — PROPOFOL 40 MG: 10 INJECTION, EMULSION INTRAVENOUS at 10:26

## 2020-01-14 RX ADMIN — SODIUM CHLORIDE, POTASSIUM CHLORIDE, SODIUM LACTATE AND CALCIUM CHLORIDE: 600; 310; 30; 20 INJECTION, SOLUTION INTRAVENOUS at 10:47

## 2020-01-14 RX ADMIN — PROPOFOL 70 MG: 10 INJECTION, EMULSION INTRAVENOUS at 10:36

## 2020-01-14 RX ADMIN — PROPOFOL 40 MG: 10 INJECTION, EMULSION INTRAVENOUS at 10:32

## 2020-01-14 RX ADMIN — PROPOFOL 40 MG: 10 INJECTION, EMULSION INTRAVENOUS at 10:45

## 2020-01-14 RX ADMIN — PROPOFOL 100 MG: 10 INJECTION, EMULSION INTRAVENOUS at 10:17

## 2020-01-14 RX ADMIN — LIDOCAINE HYDROCHLORIDE 60 MG: 40 INJECTION, SOLUTION RETROBULBAR; TOPICAL at 10:16

## 2020-01-14 RX ADMIN — SODIUM CHLORIDE, POTASSIUM CHLORIDE, SODIUM LACTATE AND CALCIUM CHLORIDE: 600; 310; 30; 20 INJECTION, SOLUTION INTRAVENOUS at 09:27

## 2020-01-14 ASSESSMENT — PAIN SCALES - GENERAL
PAINLEVEL_OUTOF10: 0

## 2020-01-14 ASSESSMENT — LIFESTYLE VARIABLES: SMOKING_STATUS: 1

## 2020-01-14 ASSESSMENT — PAIN - FUNCTIONAL ASSESSMENT: PAIN_FUNCTIONAL_ASSESSMENT: 0-10

## 2020-01-14 NOTE — H&P
[facility]   History & Physical  Aaron Gaona DO  Pt Name: Sangeeta Barrera  MRN: [id number]  [place]: 1950  Date of evaluation: 1/8/2020  Primary Care Physician: Michael Dewitt MD     Chief Complaint:        Chief Complaint   Patient presents with    Anemia       Ref. Dr. Anuj Cristina    Heartburn       3-4 years            SUBJECTIVE:     History of Present Illness:      This is a 77 y.o.  male who presents for evaluation for several issues. Previous colonoscopy 2012 per Dr. Jacobo Griffin, apparently polyps were removed at that time but there are no records to review and no pathology results available, he was told to repeat in 5 yrs but did not do so, he has a [facility] colon CA in his mother who was diagnosed age 52. Pt reports being told he had anemia for many years with his previous PCP although there are no previous labwork available to review, pt denies any blood per rectum or other evidence of bleeding. He has a normal BM daily without bloody or black stools, reports that he occasionally has lower abdominal discomfort that is relieved with BM. He does experience epigastric pain, worse when he is on a high protein diet, he is a current 1/2ppd smoker as well as consumes at least 6 cups of coffee daily. No other complaints at this time.           Past Medical History   has a past medical history of Erectile dysfunction, Measles, Mumps, and UTI (urinary tract infection).     Past Surgical History   has a past surgical history that includes Exploration of undescended testicle (1979) and Colonoscopy (2012).    Family History  family history includes Colon Cancer in his mother; Early Death in his father; Hypertension in his father.     Social History  Tobacco use:  reports that he has been smoking. He has never used smokeless tobacco.  Alcohol use:  has no history on file for alcohol.   Drug use:  has no history on file for drug.        Medications  Current Medications: Current Facility-Administered Medications          Current Outpatient Medications   Medication Sig Dispense Refill    bisacodyl (BISACODYL) 5 MG EC tablet Take per bowel prep instructions 2 tablet 0    polyethylene glycol-electrolytes (NULYTELY) 420 g solution Take 4,000 mLs by mouth once for 1 dose 4000 mL 0    polyethylene glycol (MIRALAX) powder Take per bowel prep instructions 238 g 0    sildenafil (VIAGRA) 50 MG tablet Take 1 tablet by mouth daily as needed for Erectile Dysfunction 5 tablet 1      No current facility-administered medications for this visit.          Home Medications:   Home Medications           Prior to Admission medications    Medication Sig Start Date End Date Taking? Authorizing Provider   bisacodyl (BISACODYL) 5 MG EC tablet Take per bowel prep instructions 1/8/20   Yes Benitez Valle, DO   polyethylene glycol-electrolytes (NULYTELY) 420 g solution Take 4,000 mLs by mouth once for 1 dose 1/8/20 1/8/20 Yes Benitez Valle, DO   polyethylene glycol [place]) powder Take per bowel prep instructions 1/8/20   Yes Benitez Valle, DO   sildenafil (VIAGRA) 50 MG tablet Take 1 tablet by mouth daily as needed for Erectile Dysfunction 1/2/20   Yes Chino Sage MD            Allergies  Patient has no known allergies.        Review of Systems:  General: Denies any fever, chills. Eyes: Denies any changes in vision, diplopia or eye pain  Ears, Nose, Mouth: Denies changes in hearing/tinnitus or drainage from ears, no rhinorrhea or bloody nose, no difficulty chewing  Throat: no difficulty swallowing, no throat pain  Respiratory: Denies any shortness of breath or cough. Cardiac: Denies any chest pain, palpitations, claudication or edema. Gastrointestinal: heartburn, abdominal pain  Genitourinary: Denies any frequency, urgency, hesitancy or incontinence.   Musculoskeletal: Denies worsening muscle weakness or recent trauma  Skin: Denies rashes or lesions  Psychiatric: Denies any recent changes in mood or affect  Hematologic: Denies bruising or bleeding easily.     PHYSICAL EXAMINATION  Vitals:       Vitals:     01/08/20 1547   BP: 120/70   Pulse: 72   Temp: 98.2 °F (36.8 °C)         General Appearance:  awake, alert, no acute distress, well developed, well nourished   Skin:  Skin color, texture, turgor normal. No rashes or lesions. Head/face:  NCAT, face symmetrical  Eyes:  PERRL, no evidence of conjunctivitis or ptosis bilaterally  Ears:  External ears and canals grossly normal, no evidence of otorrhea. Nose/Sinuses:  Nares normal. Septum midline. Mucosa normal. No external drainage noted. Mouth/Neck:  Mucosa moist.  No external oral lesions. Trachea midline. No visible masses. Lungs:  Normal chest expansion, unlabored breathing without accessory muscle use. No audible rales, rhonchi, or wheezing. Cardiovascular: S1S2. No evidence of JVD. No evidence of pulsatile masses in abdomen  Abdomen:  Soft, non-tender, no organomegaly, no masses. Musculoskeletal: No evidence of bony/muscular deformities, trauma, atrophy of either left/right upper/lower extremity. No evidence of digital clubbing or cyanosis. Neurologic:  CN 2-12 grossly intact without obvious deficits. Grossly normal sensation in all extremities. Psychiatric: appropriate judgement and insight, appropriate recall of recent and remote memory, no evidence of depression/anxiety/agitation     DIAGNOSES:    Diagnosis Orders   1. Anemia, unspecified type      2. Heartburn      3. Epigastric pain      4. Family history of colon cancer in mother      5. History of colon polyps      6. Smoker      7. Lower abdominal pain               PLAN:  · Schedule for EGD and colonoscopy. The risks include bleeding, infection, scarring, perforation, damage to surrounding tissues, need for further surgery in the future. The benefits, alternatives, complications and procedure details were explained to the patient, all questions were answered.   ·          Medical Decision Making: moderate complexity     Electronically signed by Vikas Llanes DO on 1/8/2020 at 4:22 PM                           [facility]  History and Physical      Pt Name: Kelsey Gates  MRN: [id number]  YOB: 1950  Date of evaluation: 1/14/2020      I have examined the patient and reviewed the H&P/Consult completed above, and there are no changes to the patient or plans. To OR for EGD and cscope.     Electronically signed by Vikas Llanes DO on 1/14/2020 at 9:12 AM

## 2020-01-14 NOTE — OP NOTE
[facility] General Surgery    OPERATIVE NOTE    DATE OF PROCEDURE: 1/14/2020    SURGEON: Dr. Emeli Coughlin: --    PREOPERATIVE DIAGNOSIS : anemia, heartburn    POSTOPERATIVE DIAGNOSIS:   1. Hiatal hernia  2. Pedunculated polyps of cecum x2, ascending colon x2, transverse colon x1, sigmoid colon x2  3. Flat irregularly shaped polyp of cecum x1  4. Internal hemorrhoids  5. Pandiverticulosis      OPERATION: EGD with biopsy, diagnostic colonoscopy with hot snare and cold forcep polypectomy and biopsy of the cecum    ANESTHESIA: MAC    ESTIMATED BLOOD LOSS:  Less than 1cc    COMPLICATIONS: None. SPECIMENS:  biopsy of antrum and distal esophagus, polypectomies, cecal biopsy    Findings:  As above    HISTORY: The patient is a 77y.o. year old male with history of above preop diagnosis. The risk, benefits, expected outcome, and alternatives to the procedure were explained to the patient's understanding and written informed consent was obtained. OPERATIVE DETAIL     The risks and benefits were explained in detail to the patient who agreed and consented to the procedure. The patient was taken to the endoscopic suite and placed in a lateral position. Oxygen was administered via nasal cannula and a bite guard was placed. MAC was administered via the anesthetic team.      The endoscope was then advanced into the oropharynx and down into the esophagus under direct visualization. The scope was further advanced through the esophagus, GE junction and stomach to the pylorus under visualization. The scope was passed through the pylorus and duodenal sweep performed, advancing and visualizing to the second portion of the duodenum. On slow withdrawal to the stomach, the following findings noted:    Duodenum: normal, long duodenal bulb  Stomach: normal    The scope was then retroflexed to visualize the GE junction, evidence of a hiatal hernia was noted. The endoscope was then withdrawn to the distal esophagus. Z line seen and found to be normal.     Biopsies were taken of the antrum and distal esophagus with cold biopsy forceps. Hemostasis was excellent. The stomach was then decompressed. The scope was slowly withdrawn visualizing the remainder of the esophagus and no other lesions/abnormalities noted. The scope was completely removed from the patient as well as the bite block. The patient was then repositioned for colonoscopy. A digital rectal exam was performed. The colonoscope was inserted into the rectum without difficulty and the scope was advanced to the cecum which was identified by anatomy and by palpation. Bowel prep was adequate. The scope was slowly withdrawn visualizing the cecum, ascending colon, transverse colon, proximal descending or rectosigmoid colon. The scope was withdrawn to the rectal vault and then retroflexed. The scope was then straightened and removed. The patient tolerated the procedure well and was transferred to the recovery unit in stable condition. Findings:  Cecum/Ascending colon: pedunculated polyp x2 7mm each of the cecum taken by hot snare. There is an area of mucosal irregularity that could be a sessile polyp vs malignant growth, multiple bites with cold biopsy forcep were taken with satisfactory hemostasis    Transverse colon: pedunculated polyp 7mm taken with hot snare    Descending/Sigmoid colon: pedunculated polyp 1.2cm taken with hot snare    Rectum/Anus: internal hemorrhoids without complications    Greater than 10 minutes was spent withdrawing the colonoscope. IMPRESSION/PLAN:   1. F/U in clinic in two weeks to discuss further treatment options based on pathology results  2.  Increase dietary fiber and water    Electronically signed by Himanshu Montejo DO on 1/14/2020 at 1:57 PM

## 2020-01-14 NOTE — ANESTHESIA PRE PROCEDURE
Answered      Vital Signs (Current):   Vitals:    01/14/20 0913   BP: 131/60   Pulse: 64   Resp: 18   Temp: 36.1 °C (96.9 °F)   TempSrc: Oral   SpO2: 99%   Weight: 222 lb 3.2 oz (100.8 kg)   Height: 6' (1.829 m)                                              BP Readings from Last 3 Encounters:   01/14/20 131/60   01/08/20 120/70   01/02/20 120/72       NPO Status: Time of last liquid consumption: 2230                        Time of last solid consumption: 1800                        Date of last liquid consumption: 01/13/20                        Date of last solid food consumption: 01/13/20    BMI:   Wt Readings from Last 3 Encounters:   01/14/20 222 lb 3.2 oz (100.8 kg)   01/08/20 235 lb (106.6 kg)   01/02/20 230 lb 9.6 oz (104.6 kg)     Body mass index is 30.14 kg/m². CBC:   Lab Results   Component Value Date    WBC 7.2 01/02/2020    RBC 3.41 01/02/2020    HGB 8.8 01/02/2020    HCT 29.6 01/02/2020    MCV 86.8 01/02/2020    RDW 38.7 01/02/2020     01/02/2020       CMP:   Lab Results   Component Value Date     01/02/2020    K 4.4 01/02/2020     01/02/2020    CO2 22 01/02/2020    BUN 19 01/02/2020    CREATININE 0.68 01/02/2020    GFRAA >60 01/02/2020    LABGLOM >60 01/02/2020    PROT 7.2 01/02/2020    CALCIUM 9.2 01/02/2020    BILITOT 0.35 01/02/2020    ALKPHOS 67 01/02/2020    AST 19 01/02/2020    ALT 16 01/02/2020       POC Tests: No results for input(s): POCGLU, POCNA, POCK, POCCL, POCBUN, POCHEMO, POCHCT in the last 72 hours.     Coags: No results found for: PROTIME, INR, APTT    HCG (If Applicable): No results found for: PREGTESTUR, PREGSERUM, HCG, HCGQUANT     ABGs: No results found for: PHART, PO2ART, BUU2MPL, YDJ1LFP, BEART, S0ANXGDB     Type & Screen (If Applicable):  No results found for: [facility]    Anesthesia Evaluation  Patient summary reviewed no history of anesthetic complications:   Airway: Mallampati: II  TM distance: >3 FB   Neck ROM: full  Mouth opening: > = 3 FB Dental: normal exam         Pulmonary:normal exam    (+) current smoker                           Cardiovascular:  Exercise tolerance: good (>4 METS),                     Neuro/Psych:               GI/Hepatic/Renal:   (+) GERD: no interval change,           Endo/Other:    (+) blood dyscrasia: anemia:., .                 Abdominal:           Vascular:                                        Anesthesia Plan      TIVA     ASA 3       Induction: intravenous. Anesthetic plan and risks discussed with patient.       Plan discussed with surgical team.                  Roman Moreau, TIMI - CRNA   1/14/2020

## 2020-01-15 ENCOUNTER — HOSPITAL ENCOUNTER (OUTPATIENT)
Dept: LAB | Age: 70
Discharge: HOME | End: 2020-01-15
Payer: MEDICARE

## 2020-01-15 LAB
FERRITIN: 556 UG/L (ref 30–400)
IRON SATURATION: 8 % (ref 20–55)
IRON: 19 UG/DL (ref 59–158)
TOTAL IRON BINDING CAPACITY: 242 UG/DL (ref 250–450)
UNSATURATED IRON BINDING CAPACITY: 223 UG/DL (ref 112–347)
VITAMIN B-12: 660 PG/ML (ref 232–1245)

## 2020-01-15 PROCEDURE — 82728 ASSAY OF FERRITIN: CPT

## 2020-01-15 PROCEDURE — 82747 ASSAY OF FOLIC ACID RBC: CPT

## 2020-01-15 PROCEDURE — 88342 IMHCHEM/IMCYTCHM 1ST ANTB: CPT

## 2020-01-15 PROCEDURE — 83550 IRON BINDING TEST: CPT

## 2020-01-15 PROCEDURE — 83540 ASSAY OF IRON: CPT

## 2020-01-15 PROCEDURE — 36415 COLL VENOUS BLD VENIPUNCTURE: CPT

## 2020-01-15 PROCEDURE — 82607 VITAMIN B-12: CPT

## 2020-01-16 LAB — SURGICAL PATHOLOGY REPORT: NORMAL

## 2020-01-17 LAB
HCT VFR BLD CALC: 28 % (ref 40.7–50.3)
RBC FOLATE: 645 NG/ML (ref 280–903)

## 2020-01-20 RX ORDER — FERROUS SULFATE 325(65) MG
325 TABLET ORAL 2 TIMES DAILY
Qty: 60 TABLET | Refills: 5 | Status: SHIPPED | OUTPATIENT
Start: 2020-01-20 | End: 2020-01-31

## 2020-01-21 ENCOUNTER — OFFICE VISIT (OUTPATIENT)
Dept: SURGERY | Age: 70
End: 2020-01-21
Payer: MEDICARE

## 2020-01-21 VITALS
SYSTOLIC BLOOD PRESSURE: 130 MMHG | HEART RATE: 78 BPM | TEMPERATURE: 97.7 F | BODY MASS INDEX: 30.07 KG/M2 | WEIGHT: 222 LBS | DIASTOLIC BLOOD PRESSURE: 78 MMHG | HEIGHT: 72 IN

## 2020-01-21 PROCEDURE — 99024 POSTOP FOLLOW-UP VISIT: CPT | Performed by: SURGERY

## 2020-01-21 PROCEDURE — 99215 OFFICE O/P EST HI 40 MIN: CPT

## 2020-01-21 RX ORDER — POLYETHYLENE GLYCOL 3350, SODIUM CHLORIDE, SODIUM BICARBONATE, POTASSIUM CHLORIDE 420; 11.2; 5.72; 1.48 G/4L; G/4L; G/4L; G/4L
4000 POWDER, FOR SOLUTION ORAL ONCE
Qty: 4000 ML | Refills: 0 | Status: SHIPPED | OUTPATIENT
Start: 2020-01-21 | End: 2020-01-21

## 2020-01-21 NOTE — PROGRESS NOTES
[facility] General Surgery Clinic  Progress Note    PATIENT NAME: Sydni Shields     CLINIC VISIT DATE: 1/21/2020    SUBJECTIVE:  Sydni Shields is a 77 y.o. male who presents to the clinic today for follow-up to EGD and colonoscopy, his wife is also present. Pathology results of numerous polyps removed significant for tubular adenomas and submucosal lipomas. There was a large flat lesion of the junction between the cecum and ascending colon that was not removed during endoscopy and biopsy shows that this is also a tubular adenoma. Patient denies any new complaints since our encounter for endoscopy. OBJECTIVE:    /78   Pulse 78   Temp 97.7 °F (36.5 °C) (Tympanic)   Ht 6' (1.829 m)   Wt 222 lb (100.7 kg)   BMI 30.11 kg/m²     General Appearance:  awake, alert, no acute distress, well developed, well nourished   Skin:  Skin color, texture, turgor normal. No rashes or lesions. Lungs:  Normal chest expansion, unlabored breathing without accessory muscle use. No audible rales, rhonchi, or wheezing. Cardiovascular: S1S2. No evidence of JVD. No evidence of pulsatile masses in abdomen  Abdomen:  Soft, non-tender, no organomegaly, no masses. Musculoskeletal: No evidence of bony/muscular deformities, trauma, atrophy of either left/right upper/lower extremity. No evidence of digital clubbing or cyanosis. ASSESSMENT:   Diagnosis Orders   1. Adenomatous polyp of ascending colon     2. Family history of colon cancer in mother         PLAN:  We had a long discussion regarding treatment plan. We will attempt colonoscopy in 2 months to try to remove the remainder of the polyp in the proximal ascending colon. We did discuss elective right hemicolectomy in the case that this polyp cannot be removed in its entirety. All questions were answered, patient and wife are agreeable with this plan.     Schedule colonoscopy mid-March    Electronically signed by Italia Jacobo DO on 1/21/2020 at 10:57 AM

## 2020-01-30 ENCOUNTER — TELEPHONE (OUTPATIENT)
Dept: INTERNAL MEDICINE | Age: 70
End: 2020-01-30

## 2020-01-30 NOTE — TELEPHONE ENCOUNTER
Patient notified and he will stop taking the iron, but he thinks it may be flu. I advised him to let us know if he gets worse and if he feels he needs to be seen he can call us or go to the . He verbalized understanding.

## 2020-01-30 NOTE — TELEPHONE ENCOUNTER
Patient's wife states that  is complaining of really bad stomach cramps and black stool x1 week and he is wondering if he should stop his iron supplement. States that there is no blood in the stool.

## 2020-01-31 ENCOUNTER — APPOINTMENT (OUTPATIENT)
Dept: INTERVENTIONAL RADIOLOGY/VASCULAR | Age: 70
DRG: 654 | End: 2020-01-31
Payer: MEDICARE

## 2020-01-31 ENCOUNTER — APPOINTMENT (OUTPATIENT)
Dept: GENERAL RADIOLOGY | Age: 70
DRG: 654 | End: 2020-01-31
Payer: MEDICARE

## 2020-01-31 ENCOUNTER — TELEPHONE (OUTPATIENT)
Dept: SURGERY | Age: 70
End: 2020-01-31

## 2020-01-31 ENCOUNTER — HOSPITAL ENCOUNTER (INPATIENT)
Age: 70
LOS: 6 days | Discharge: HOME | DRG: 654 | End: 2020-02-07
Attending: EMERGENCY MEDICINE | Admitting: INTERNAL MEDICINE
Payer: MEDICARE

## 2020-01-31 LAB
-: ABNORMAL
ABSOLUTE BANDS #: 1.24 K/UL
ABSOLUTE EOS #: 0 K/UL (ref 0–0.4)
ABSOLUTE IMMATURE GRANULOCYTE: 0 K/UL (ref 0–0.3)
ABSOLUTE LYMPH #: 0.25 K/UL (ref 1–4.8)
ABSOLUTE MONO #: 0.25 K/UL (ref 0.1–1.2)
AMORPHOUS: ABNORMAL
ANION GAP SERPL CALCULATED.3IONS-SCNC: 15 MMOL/L (ref 9–17)
BACTERIA: ABNORMAL
BANDS: 10 %
BASOPHILS # BLD: 1 % (ref 0–2)
BASOPHILS ABSOLUTE: 0.12 K/UL (ref 0–0.2)
BILIRUBIN URINE: NEGATIVE
BUN BLDV-MCNC: 20 MG/DL (ref 8–23)
BUN/CREAT BLD: 24 (ref 9–20)
CALCIUM SERPL-MCNC: 8.7 MG/DL (ref 8.6–10.4)
CASTS UA: ABNORMAL /LPF (ref 0–2)
CHLORIDE BLD-SCNC: 101 MMOL/L (ref 98–107)
CO2: 21 MMOL/L (ref 20–31)
COLOR: ABNORMAL
COMMENT UA: ABNORMAL
CREAT SERPL-MCNC: 0.82 MG/DL (ref 0.7–1.2)
CRYSTALS, UA: ABNORMAL /HPF
DIFFERENTIAL TYPE: ABNORMAL
DIRECT EXAM: NORMAL
EOSINOPHILS RELATIVE PERCENT: 0 % (ref 1–8)
EPITHELIAL CELLS UA: ABNORMAL /HPF (ref 0–5)
GFR AFRICAN AMERICAN: >60 ML/MIN
GFR NON-AFRICAN AMERICAN: >60 ML/MIN
GFR SERPL CREATININE-BSD FRML MDRD: ABNORMAL ML/MIN/{1.73_M2}
GFR SERPL CREATININE-BSD FRML MDRD: ABNORMAL ML/MIN/{1.73_M2}
GLUCOSE BLD-MCNC: 168 MG/DL (ref 70–99)
GLUCOSE URINE: NEGATIVE
HCT VFR BLD CALC: 25.1 % (ref 40.7–50.3)
HEMOGLOBIN: 7.6 G/DL (ref 13–17)
IMMATURE GRANULOCYTES: 0 %
KETONES, URINE: NEGATIVE
LACTIC ACID: 1.8 MMOL/L (ref 0.5–2.2)
LEUKOCYTE ESTERASE, URINE: ABNORMAL
LYMPHOCYTES # BLD: 2 % (ref 15–43)
Lab: NORMAL
MCH RBC QN AUTO: 24 PG (ref 25.2–33.5)
MCHC RBC AUTO-ENTMCNC: 30.3 G/DL (ref 25.2–33.5)
MCV RBC AUTO: 79.2 FL (ref 82.6–102.9)
METAMYELOCYTES ABSOLUTE COUNT: 0.25 K/UL
METAMYELOCYTES: 2 %
MONOCYTES # BLD: 2 % (ref 6–14)
MORPHOLOGY: ABNORMAL
MUCUS: ABNORMAL
NITRITE, URINE: NEGATIVE
NRBC AUTOMATED: 1.4 PER 100 WBC
OTHER OBSERVATIONS UA: ABNORMAL
PDW BLD-RTO: 39.4 % (ref 11.8–14.4)
PH UA: 6 (ref 5–6)
PLATELET # BLD: 129 K/UL (ref 138–453)
PLATELET ESTIMATE: ABNORMAL
PMV BLD AUTO: ABNORMAL FL (ref 8.1–13.5)
POC OCCULT BLOOD, FECAL: NEGATIVE
POTASSIUM SERPL-SCNC: 3.9 MMOL/L (ref 3.7–5.3)
PROTEIN UA: NEGATIVE
RBC # BLD: 3.17 M/UL (ref 4.21–5.77)
RBC # BLD: ABNORMAL 10*6/UL
RBC UA: ABNORMAL /HPF (ref 0–4)
RENAL EPITHELIAL, UA: ABNORMAL /HPF
SEG NEUTROPHILS: 83 % (ref 44–74)
SEGMENTED NEUTROPHILS ABSOLUTE COUNT: 10.29 K/UL (ref 1.8–7.7)
SODIUM BLD-SCNC: 137 MMOL/L (ref 135–144)
SPECIFIC GRAVITY UA: 1.02 (ref 1.01–1.02)
SPECIMEN DESCRIPTION: NORMAL
TRICHOMONAS: ABNORMAL
TURBIDITY: ABNORMAL
URINE HGB: ABNORMAL
UROBILINOGEN, URINE: NORMAL
WBC # BLD: 12.4 K/UL (ref 3.5–11.3)
WBC # BLD: ABNORMAL 10*3/UL
WBC UA: ABNORMAL /HPF (ref 0–4)
YEAST: ABNORMAL

## 2020-01-31 PROCEDURE — 36415 COLL VENOUS BLD VENIPUNCTURE: CPT

## 2020-01-31 PROCEDURE — 80048 BASIC METABOLIC PNL TOTAL CA: CPT

## 2020-01-31 PROCEDURE — 96365 THER/PROPH/DIAG IV INF INIT: CPT

## 2020-01-31 PROCEDURE — 85025 COMPLETE CBC W/AUTO DIFF WBC: CPT

## 2020-01-31 PROCEDURE — 6370000000 HC RX 637 (ALT 250 FOR IP): Performed by: INTERNAL MEDICINE

## 2020-01-31 PROCEDURE — 99284 EMERGENCY DEPT VISIT MOD MDM: CPT

## 2020-01-31 PROCEDURE — 51798 US URINE CAPACITY MEASURE: CPT

## 2020-01-31 PROCEDURE — 2580000003 HC RX 258: Performed by: EMERGENCY MEDICINE

## 2020-01-31 PROCEDURE — 6360000002 HC RX W HCPCS: Performed by: EMERGENCY MEDICINE

## 2020-01-31 PROCEDURE — 87086 URINE CULTURE/COLONY COUNT: CPT

## 2020-01-31 PROCEDURE — 6370000000 HC RX 637 (ALT 250 FOR IP): Performed by: EMERGENCY MEDICINE

## 2020-01-31 PROCEDURE — 87804 INFLUENZA ASSAY W/OPTIC: CPT

## 2020-01-31 PROCEDURE — 87150 DNA/RNA AMPLIFIED PROBE: CPT

## 2020-01-31 PROCEDURE — 81001 URINALYSIS AUTO W/SCOPE: CPT

## 2020-01-31 PROCEDURE — 6360000002 HC RX W HCPCS: Performed by: INTERNAL MEDICINE

## 2020-01-31 PROCEDURE — 87077 CULTURE AEROBIC IDENTIFY: CPT

## 2020-01-31 PROCEDURE — 87205 SMEAR GRAM STAIN: CPT

## 2020-01-31 PROCEDURE — 94761 N-INVAS EAR/PLS OXIMETRY MLT: CPT

## 2020-01-31 PROCEDURE — 93880 EXTRACRANIAL BILAT STUDY: CPT

## 2020-01-31 PROCEDURE — 82272 OCCULT BLD FECES 1-3 TESTS: CPT

## 2020-01-31 PROCEDURE — 71046 X-RAY EXAM CHEST 2 VIEWS: CPT

## 2020-01-31 PROCEDURE — G0378 HOSPITAL OBSERVATION PER HR: HCPCS

## 2020-01-31 PROCEDURE — 83605 ASSAY OF LACTIC ACID: CPT

## 2020-01-31 PROCEDURE — 2580000003 HC RX 258: Performed by: INTERNAL MEDICINE

## 2020-01-31 PROCEDURE — 87040 BLOOD CULTURE FOR BACTERIA: CPT

## 2020-01-31 PROCEDURE — 96372 THER/PROPH/DIAG INJ SC/IM: CPT

## 2020-01-31 PROCEDURE — 99223 1ST HOSP IP/OBS HIGH 75: CPT | Performed by: INTERNAL MEDICINE

## 2020-01-31 RX ORDER — ACETAMINOPHEN 500 MG
1000 TABLET ORAL ONCE
Status: COMPLETED | OUTPATIENT
Start: 2020-01-31 | End: 2020-01-31

## 2020-01-31 RX ORDER — SODIUM CHLORIDE 0.9 % (FLUSH) 0.9 %
10 SYRINGE (ML) INJECTION EVERY 12 HOURS SCHEDULED
Status: DISCONTINUED | OUTPATIENT
Start: 2020-01-31 | End: 2020-02-07 | Stop reason: HOSPADM

## 2020-01-31 RX ORDER — SODIUM CHLORIDE 0.9 % (FLUSH) 0.9 %
10 SYRINGE (ML) INJECTION PRN
Status: DISCONTINUED | OUTPATIENT
Start: 2020-01-31 | End: 2020-02-07 | Stop reason: HOSPADM

## 2020-01-31 RX ORDER — ONDANSETRON 2 MG/ML
8 INJECTION INTRAMUSCULAR; INTRAVENOUS EVERY 4 HOURS PRN
Status: DISCONTINUED | OUTPATIENT
Start: 2020-01-31 | End: 2020-02-07 | Stop reason: HOSPADM

## 2020-01-31 RX ORDER — ACETAMINOPHEN 325 MG/1
650 TABLET ORAL EVERY 4 HOURS PRN
Status: DISCONTINUED | OUTPATIENT
Start: 2020-01-31 | End: 2020-02-04

## 2020-01-31 RX ORDER — SODIUM CHLORIDE 9 MG/ML
INJECTION, SOLUTION INTRAVENOUS CONTINUOUS
Status: DISCONTINUED | OUTPATIENT
Start: 2020-01-31 | End: 2020-02-01

## 2020-01-31 RX ORDER — CEFTRIAXONE 1 G/1
1 INJECTION, POWDER, FOR SOLUTION INTRAMUSCULAR; INTRAVENOUS EVERY 24 HOURS
Status: DISCONTINUED | OUTPATIENT
Start: 2020-02-01 | End: 2020-01-31

## 2020-01-31 RX ORDER — TAMSULOSIN HYDROCHLORIDE 0.4 MG/1
0.4 CAPSULE ORAL DAILY
Status: DISCONTINUED | OUTPATIENT
Start: 2020-01-31 | End: 2020-02-07 | Stop reason: HOSPADM

## 2020-01-31 RX ORDER — 0.9 % SODIUM CHLORIDE 0.9 %
1000 INTRAVENOUS SOLUTION INTRAVENOUS ONCE
Status: COMPLETED | OUTPATIENT
Start: 2020-01-31 | End: 2020-01-31

## 2020-01-31 RX ORDER — NICOTINE 21 MG/24HR
1 PATCH, TRANSDERMAL 24 HOURS TRANSDERMAL DAILY
Status: DISCONTINUED | OUTPATIENT
Start: 2020-01-31 | End: 2020-02-07 | Stop reason: HOSPADM

## 2020-01-31 RX ORDER — 0.9 % SODIUM CHLORIDE 0.9 %
500 INTRAVENOUS SOLUTION INTRAVENOUS ONCE
Status: COMPLETED | OUTPATIENT
Start: 2020-01-31 | End: 2020-01-31

## 2020-01-31 RX ADMIN — SODIUM CHLORIDE 1000 ML: 9 INJECTION, SOLUTION INTRAVENOUS at 15:40

## 2020-01-31 RX ADMIN — CEFTRIAXONE 1 G: 1 INJECTION, POWDER, FOR SOLUTION INTRAMUSCULAR; INTRAVENOUS at 11:00

## 2020-01-31 RX ADMIN — ACETAMINOPHEN 650 MG: 325 TABLET ORAL at 23:33

## 2020-01-31 RX ADMIN — SODIUM CHLORIDE 1000 ML: 9 INJECTION, SOLUTION INTRAVENOUS at 12:21

## 2020-01-31 RX ADMIN — SODIUM CHLORIDE: 9 INJECTION, SOLUTION INTRAVENOUS at 20:19

## 2020-01-31 RX ADMIN — ACETAMINOPHEN 1000 MG: 500 TABLET, FILM COATED ORAL at 09:35

## 2020-01-31 RX ADMIN — SODIUM CHLORIDE: 9 INJECTION, SOLUTION INTRAVENOUS at 13:23

## 2020-01-31 RX ADMIN — ENOXAPARIN SODIUM 40 MG: 40 INJECTION SUBCUTANEOUS at 12:38

## 2020-01-31 RX ADMIN — SODIUM CHLORIDE 500 ML: 9 INJECTION, SOLUTION INTRAVENOUS at 09:37

## 2020-01-31 ASSESSMENT — PAIN SCALES - GENERAL
PAINLEVEL_OUTOF10: 3
PAINLEVEL_OUTOF10: 0
PAINLEVEL_OUTOF10: 0
PAINLEVEL_OUTOF10: 1
PAINLEVEL_OUTOF10: 3

## 2020-01-31 ASSESSMENT — PAIN - FUNCTIONAL ASSESSMENT: PAIN_FUNCTIONAL_ASSESSMENT: ACTIVITIES ARE NOT PREVENTED

## 2020-01-31 ASSESSMENT — PAIN DESCRIPTION - ONSET: ONSET: PROGRESSIVE

## 2020-01-31 ASSESSMENT — PAIN DESCRIPTION - PAIN TYPE
TYPE: ACUTE PAIN
TYPE: ACUTE PAIN

## 2020-01-31 ASSESSMENT — PAIN DESCRIPTION - LOCATION
LOCATION: ABDOMEN
LOCATION: GENERALIZED

## 2020-01-31 ASSESSMENT — PAIN DESCRIPTION - DESCRIPTORS: DESCRIPTORS: ACHING

## 2020-01-31 NOTE — ED PROVIDER NOTES
[facility] ED  [address]  [place] [address]  Phone: [address]      Pt Name: Marty Story  MRN: [id number]  [place] 1950  Date of evaluation: 1/31/2020    CHIEF COMPLAINT       Chief Complaint   Patient presents with    Fever    Cough       HISTORY OF PRESENT ILLNESS    Marty Story is a 77 y.o. male who presents to the emergency department with multiple complaints. He has had UTI off and on for the past couple of months has been on antibiotics and is gotten better however he feels like it is gotten worse over the past couple of days. He admits to a cough that is nonproductive. Denies chest pain or shortness of breath. Fever today on arrival 103.3 took Advil around 8:00. No recent travel or sick contacts. He is also having 3/10 abdominal cramping without diarrhea or constipation. Currently on iron for anemia. Admits to dark stools denies bright red blood    REVIEW OF SYSTEMS       Constitutional: Positive fevers and chills  HEENT: No sore throat, rhinorrhea, or earache   Eyes: No blurry vision or double vision no drainage   Cardiovascular: No chest pain or tachycardia   Respiratory: No wheezing or shortness of breath positive cough  Gastrointestinal: No nausea, vomiting, diarrhea, constipation, or abdominal pain positive abdominal cramping  : No hematuria positive dysuria   Musculoskeletal: No swelling or pain   Skin: No rash   Neurological: No focal neurologic complaints, paresthesias, weakness, or headache     PAST MEDICAL HISTORY    has a past medical history of Erectile dysfunction, Measles, Mumps, and UTI (urinary tract infection). SURGICAL HISTORY      has a past surgical history that includes Exploration of undescended testicle (1979); Colonoscopy (2012); Upper gastrointestinal endoscopy (N/A, 1/14/2020); and Colonoscopy (N/A, 1/14/2020).     CURRENT MEDICATIONS       Previous Medications    BISACODYL (BISACODYL) 5 MG EC TABLET    Take per bowel prep instructions    FERROUS SULFATE 325 (65 FE) MG TABLET    Take 1 tablet by mouth 2 times daily    SILDENAFIL (VIAGRA) 50 MG TABLET    Take 1 tablet by mouth daily as needed for Erectile Dysfunction       ALLERGIES     has No Known Allergies. FAMILY HISTORY     He indicated that the status of his mother is unknown. He indicated that the status of his father is unknown.     family history includes Colon Cancer in his mother; Early Death in his father; Hypertension in his father. SOCIAL HISTORY      reports that he has been smoking. He has been smoking about 0.50 packs per day. He has never used smokeless tobacco. He reports current alcohol use. He reports that he does not use drugs. PHYSICAL EXAM       ED Triage Vitals [01/31/20 0905]   BP Temp Temp Source Pulse Resp SpO2 Height Weight   (!) 141/58 103.3 °F (39.6 °C) Tympanic 95 22 95 % 6' (1.829 m) 220 lb 3 oz (99.9 kg)       Constitutional: Alert, oriented x3, nontoxic, answering questions appropriately, acting properly for age, in no acute distress febrile  HEENT: Extraocular muscles intact, mucus membranes dry, TMs clear bilaterally, no posterior pharyngeal erythema or exudates, Pupils equal, round, reactive to light,   Neck: Trachea midline   Cardiovascular: Regular rhythm and rate no S3, S4, or murmurs   Respiratory: Clear to auscultation bilaterally no wheezes, rhonchi, rales, no respiratory distress no tachypnea no retractions no hypoxia  Gastrointestinal: Soft, nontender, nondistended, positive bowel sounds. No rebound, rigidity, or guarding. No abdominal bruit no pulsatile mass   musculoskeletal: No extremity pain or swelling   Neurologic: Moving all 4 extremities without difficulty there are no gross focal neurologic deficits   Skin: Warm and dry       DIFFERENTIAL DIAGNOSIS/ MDM:     IV fluids and labs. Chest x-ray. Check influenza. Urinalysis. Tylenol for fever.     DIAGNOSTIC RESULTS     EKG: All EKG's are interpreted [facility] who either signs or Co-signs this chart in the absence of a cardiologist.        Not indicated unless otherwise documented above    LABS:  Results for orders placed or performed during the hospital encounter of 01/31/20   Rapid influenza A/B antigens   Result Value Ref Range    Specimen Description . NARES     Special Requests NOT REPORTED     Direct Exam       NEGATIVE FOR INFLUENZA A AND B ANTIGEN. * A negative result does not exclude Influenza infection. Negative results should be confirmed by PCR testing.    CBC Auto Differential   Result Value Ref Range    WBC 12.4 (H) 3.5 - 11.3 k/uL    RBC 3.17 (L) 4.21 - 5.77 m/uL    Hemoglobin 7.6 (L) 13.0 - 17.0 g/dL    Hematocrit 25.1 (L) 40.7 - 50.3 %    MCV 79.2 (L) 82.6 - 102.9 fL    MCH 24.0 (L) 25.2 - 33.5 pg    MCHC 30.3 25.2 - 33.5 g/dL    RDW 39.4 (H) 11.8 - 14.4 %    Platelets 453 (L) 642 - 453 k/uL    MPV Abnormal 8.1 - 13.5 fL    NRBC Automated 1.4 (H) 0.0 per 100 WBC    Differential Type NOT REPORTED     WBC Morphology NOT REPORTED     RBC Morphology NOT REPORTED     Platelet Estimate NOT REPORTED     Monocytes 2 (L) 6 - 14 %    Lymphocytes 2 (L) 15 - 43 %    Seg Neutrophils 83 (H) 44 - 74 %    Eosinophils % 0 (L) 1 - 8 %    Basophils 1 0 - 2 %    Immature Granulocytes 0 0 %    Metamyelocytes 2 (H) 0 %    Bands 10 (H) 0 %    Absolute Mono # 0.25 0.1 - 1.2 k/uL    Absolute Lymph # 0.25 (L) 1.0 - 4.8 k/uL    Segs Absolute 10.29 (H) 1.8 - 7.7 k/uL    Absolute Eos # 0.00 0.0 - 0.4 k/uL    Basophils Absolute 0.12 0.0 - 0.2 k/uL    Absolute Immature Granulocyte 0.00 0.00 - 0.30 k/uL    Metamyelocytes Absolute 0.25 (H) 0 k/uL    Absolute Bands # 1.24 k/uL    Morphology ANISOCYTOSIS PRESENT     Morphology HYPOCHROMIA PRESENT     Morphology SCHISTOCYTES     Morphology BASOPHILIC STIPPLING PRESENT     Morphology ACANTHOCYTES     Morphology Platelet scan shows Decreased Platelets     Morphology LARGE PLATELETS PRESENT Basic Metabolic Panel   Result Value Ref Range    Glucose 168 (H) 70 - 99 mg/dL    BUN 20 8 - 23 mg/dL    CREATININE 0.82 0.70 - 1.20 mg/dL    Bun/Cre Ratio 24 (H) 9 - 20    Calcium 8.7 8.6 - 10.4 mg/dL    Sodium 137 135 - 144 mmol/L    Potassium 3.9 3.7 - 5.3 mmol/L    Chloride 101 98 - 107 mmol/L    CO2 21 20 - 31 mmol/L    Anion Gap 15 9 - 17 mmol/L    GFR Non-African American >60 >60 mL/min    GFR African American >60 >60 mL/min    GFR Comment          GFR Staging NOT REPORTED    Lactic Acid   Result Value Ref Range    Lactic Acid 1.8 0.5 - 2.2 mmol/L   Urinalysis Reflex to Culture   Result Value Ref Range    Color, UA NOT REPORTED YELLOW    Turbidity UA NOT REPORTED CLEAR    Glucose, Ur NEGATIVE NEGATIVE    Bilirubin Urine NEGATIVE NEGATIVE    Ketones, Urine NEGATIVE NEGATIVE    Specific Gravity, UA 1.025 1.010 - 1.025    Urine Hgb TRACE (A) NEGATIVE    pH, UA 6.0 5.0 - 6.0    Protein, UA NEGATIVE NEGATIVE    Urobilinogen, Urine Normal Normal    Nitrite, Urine NEGATIVE NEGATIVE    Leukocyte Esterase, Urine 1+ (A) NEGATIVE    Urinalysis Comments NOT REPORTED    Microscopic Urinalysis   Result Value Ref Range    -          WBC, UA 10 TO 25 0 - 4 /HPF    RBC, UA 10 TO 25 0 - 4 /HPF    Casts UA NOT REPORTED 0 - 2 /LPF    Crystals UA NOT REPORTED None /HPF    Epithelial Cells UA 0 TO 4 0 - 5 /HPF    Renal Epithelial, Urine NOT REPORTED 0 /HPF    Bacteria, UA 3+ (A) None    Mucus, UA 1+ (A) None    Trichomonas, UA NOT REPORTED None    Amorphous, UA 1+ (A) None    Other Observations UA Specimen Cultured (A) NOT REQ. Yeast, UA NOT REPORTED None   POC Fecal Occult Blood   Result Value Ref Range    POC Occult Blood, Fecal NEGATIVE NEGATIVE       Not indicated unless otherwise documented above    RADIOLOGY:   I reviewed the radiologist interpretations:    XR CHEST STANDARD (2 VW)   Final Result   No acute cardiopulmonary process.              Not indicated unless otherwise documented above    EMERGENCY DEPARTMENT COURSE:     The patient was given the following medications:  Orders Placed This Encounter   Medications    0.9 % sodium chloride bolus    acetaminophen (TYLENOL) tablet 1,000 mg    cefTRIAXone (ROCEPHIN) 1 g IVPB in 50 mL D5W minibag        Vitals:   -------------------------  BP (!) 102/54   Pulse 80   Temp 101.7 °F (38.7 °C)   Resp 22   Ht 6' (1.829 m)   Wt 220 lb 3 oz (99.9 kg)   SpO2 93%   BMI 29.86 kg/m²     10:25 AM chest x-ray negative for infiltrate. Hemoglobin 7.6 rectal exam negative for occult blood but there was black stool secondary to iron. Normal white blood cell count normal lactic acid level positive UTI will discuss admission with Dr. Siva Larios. 10:40 AM discussed with Dr. Siva Larios who agrees to admission. I have reviewed the disposition diagnosis with the patient and or their family/guardian. I have answered their questions and given discharge instructions. They voiced understanding of these instructions and did not have any furtherquestions or complaints. CRITICAL CARE:    None    CONSULTS:    None    PROCEDURES:    None      OARRS Report if indicated             FINAL IMPRESSION      1. Acute UTI    2. Anemia, unspecified type          DISPOSITION/PLAN   DISPOSITION Decision To Admit 01/31/2020 10:26:21 AM        CONDITION ON DISPOSITION: STABLE       PATIENT REFERRED TO:  No follow-up provider specified.     DISCHARGE MEDICATIONS:  New Prescriptions    No medications on file       (Please note that portions of thisnote were completed with a voice recognition program.  Efforts were made to edit the dictations but occasionally words are mis-transcribed.)    Mehta DO  Attending Emergency Physician        Ovi Serna DO  01/31/20 1041

## 2020-01-31 NOTE — TELEPHONE ENCOUNTER
Looks like he's being admitted/treated for UTI, we will plan on keeping his cscope appointment for March.

## 2020-01-31 NOTE — CARE COORDINATION
The Plan for Transition of Care is related to the following treatment goals: Home no needs    The Patient and/or patient representative spouse was provided with a choice of provider and agrees with the discharge plan. [x] Yes [] No    Freedom of choice list was provided with basic dialogue that supports the patient's individualized plan of care/goals, treatment preferences and shares the quality data associated with the providers.  [] Yes [x] No

## 2020-01-31 NOTE — H&P
HOSPITALIST ADMISSION H&P      REASON FOR ADMISSION:    UTI--POA---fever---shaking chills  ESTIMATED LENGTH OF STAY:  > 2 midnights----2-3 days      ATTENDING/ADMITTING PHYSICIAN: Lillian Isabel MD  PCP: James Bernabe MD    HISTORY OF PRESENT ILLNESS:      The patient is a 77 y.o. male patient of Siva LANG MD who presents with onset of fever--chills--painful urination---2020---worsened over the next 24 hours---today, 2020---fever to 103F----violent shaking chills-nausea--vomiting this AM--more of the dysuria----states that he has had recurrent and repeated events of UTIs for which he has been treated with Cipro and Macrobid-----the infections go away for up to several weeks and then recur--was to see Urology, but had to cancel due to his sister's death and ---will need Urology follow up    Was negative for Influenza A and B.  Ddd, however, have some respiratory signs and symptoms reported, although patient denies, such that other viral syndromes not excluded---no foreign travel or exposures known    Recent sessile colon polyp and seen by Dr. Zenobia Vang -----colonoscopy---2020 without incident and will need follow up colonoscopy    Tobacco abuse--- PPD---cessation encouraged--nicotine patch this hospitalization----prior Chantix with prior episodes of cessation    On examination today---left carotid bruit present--II/VI---DUS CV ---> 4-19% TAMEKA-LICA---patent antegrade vertebrals---35% hard plaque at bifurcations bilaterally . See below for additional PMH.     Patient gwgx-skstwoorne-bpwxiwff-available records reviewed, including, but not limited to,  ER reports--labs--imaging--office records---personal otes     Past Medical History:   Diagnosis Date    Erectile dysfunction     Measles     Mumps     UTI (urinary tract infection)            Past Surgical History:   Procedure Laterality Date    COLONOSCOPY      COLONOSCOPY N/A 2020    COLONOSCOPY performed by Diogo Epps DO at [address] [place] [address]  EXPLORATION OF UNDESCENDED TESTICLE  1979    UPPER GASTROINTESTINAL ENDOSCOPY N/A 1/14/2020    EGD BIOPSY performed by Dawit El DO at [facility] OR       Medications Prior to Admission:    Medications Prior to Admission: sildenafil (VIAGRA) 50 MG tablet, Take 1 tablet by mouth daily as needed for Erectile Dysfunction    Allergies:    Patient has no known allergies. Social History:    reports that he has been smoking cigarettes. He has been smoking about 0.50 packs per day. He has never used smokeless tobacco. He reports current alcohol use. He reports that he does not use drugs. Family History:   family history includes Colon Cancer in his mother; Early Death in his father; Hypertension in his father. REVIEW OF SYSTEMS:  See HPI and problem list; otherwise no other new complaints with respect to HEENT, neck, pulmonary, coronary, GI, , endocrine, musculoskeletal, immune system/connective tissue disease, hematologic, neuropsych, skin, lymphatics, or malignancies. PHYSICAL EXAM:  Vitals:  BP (!) 93/54   Pulse 73   Temp 99.5 °F (37.5 °C) (Tympanic)   Resp 16   Ht 6' (1.829 m)   Wt 226 lb 3.2 oz (102.6 kg)   SpO2 97%   BMI 30.68 kg/m²     HEENT: Normocephalic and Atraumatic  Neck: Supple, No Masses, Tenderness, Nodularity, No Lymphadenopathy and Bruits Present ---left-sided---II/VI  Chest/Lungs: Clear to Auscultation without Rales, Rhonchi, or Wheezes and Distant Breath Sounds  Cardiac: Regular Rate and Rhythm  GI/Abdomen:  Bowel Sounds Present and Soft, Non-tender, without Guarding or Rebound Tenderness  : Not examined  EXT/Skin: No Cyanosis, No Clubbing and Edema Present ---trivial  Neuro: Alert and Oriented, to Person, to Time, to Place, to Situation and No Localizing Signs/Symptoms        LABS:    CBC with Differential:    Lab Results   Component Value Date    WBC 12.4 01/31/2020    RBC 3.17 01/31/2020    HGB 7.6 01/31/2020    HCT 25.1 01/31/2020     01/31/2020    MCV 79.2 01/31/2020 MCH 24.0 01/31/2020    MCHC 30.3 01/31/2020    RDW 39.4 01/31/2020    NRBC 2 01/02/2020    METASPCT 2 01/31/2020    LYMPHOPCT 2 01/31/2020    MONOPCT 2 01/31/2020    MYELOPCT 13 01/02/2020    BASOPCT 1 01/31/2020    MONOSABS 0.25 01/31/2020    LYMPHSABS 0.25 01/31/2020    EOSABS 0.00 01/31/2020    BASOSABS 0.12 01/31/2020    DIFFTYPE NOT REPORTED 01/31/2020     BMP:    Lab Results   Component Value Date     01/31/2020    K 3.9 01/31/2020     01/31/2020    CO2 21 01/31/2020    BUN 20 01/31/2020    LABALBU 4.6 01/02/2020    CREATININE 0.82 01/31/2020    CALCIUM 8.7 01/31/2020    GFRAA >60 01/31/2020    LABGLOM >60 01/31/2020    GLUCOSE 168 01/31/2020       ASSESSMENT:      Patient Active Problem List   Diagnosis    Tobacco use    Acute UTI    Erectile dysfunction    Anemia    Hiatal hernia    Polyp of ascending colon    Polyp of cecum    Polyp of transverse colon    Polyp of sigmoid colon       Code Status:  FULL CODE  OARRS---1.31.2020----[-]    PLAN:    1. UTI---POA---IV Rocephin pending cultures--blood and urine---will need to see Urology outpatient  2. Dehydration----IVF  3. Home medications reviewed  4. Antiemetics as required  5. Tobacco abuse---cessation--nicotine patch---when condition improves consider Chantix  6. Aspirin when nausea-vomiting resolved   7.    See orders     Note that over 50 minutes was spent in evaluation of the patient, review of the chart and pertinent records, discussion with family/staff, etc    Ruslan Walker MD  1:33 PM  1/31/2020

## 2020-02-01 ENCOUNTER — APPOINTMENT (OUTPATIENT)
Dept: CT IMAGING | Age: 70
DRG: 654 | End: 2020-02-01
Payer: MEDICARE

## 2020-02-01 ENCOUNTER — APPOINTMENT (OUTPATIENT)
Dept: GENERAL RADIOLOGY | Age: 70
DRG: 654 | End: 2020-02-01
Payer: MEDICARE

## 2020-02-01 PROBLEM — N39.0 UTI (URINARY TRACT INFECTION): Status: ACTIVE | Noted: 2020-02-01

## 2020-02-01 LAB
-: NORMAL
-: NORMAL
ANION GAP SERPL CALCULATED.3IONS-SCNC: 17 MMOL/L (ref 9–17)
BNP INTERPRETATION: ABNORMAL
BUN BLDV-MCNC: 15 MG/DL (ref 8–23)
BUN/CREAT BLD: 21 (ref 9–20)
CALCIUM SERPL-MCNC: 8.3 MG/DL (ref 8.6–10.4)
CHLORIDE BLD-SCNC: 102 MMOL/L (ref 98–107)
CO2: 16 MMOL/L (ref 20–31)
CREAT SERPL-MCNC: 0.72 MG/DL (ref 0.7–1.2)
D DIMER: 2720 NG/ML
FIO2: 28
GFR AFRICAN AMERICAN: >60 ML/MIN
GFR NON-AFRICAN AMERICAN: >60 ML/MIN
GFR SERPL CREATININE-BSD FRML MDRD: ABNORMAL ML/MIN/{1.73_M2}
GFR SERPL CREATININE-BSD FRML MDRD: ABNORMAL ML/MIN/{1.73_M2}
GLUCOSE BLD-MCNC: 134 MG/DL (ref 70–99)
HCT VFR BLD CALC: 22.2 % (ref 40.7–50.3)
HCT VFR BLD CALC: 23.1 % (ref 40.7–50.3)
HEMOGLOBIN: 6.7 G/DL (ref 13–17)
HEMOGLOBIN: 7.1 G/DL (ref 13–17)
MCH RBC QN AUTO: 23.8 PG (ref 25.2–33.5)
MCHC RBC AUTO-ENTMCNC: 30.2 G/DL (ref 25.2–33.5)
MCV RBC AUTO: 78.7 FL (ref 82.6–102.9)
NEGATIVE BASE EXCESS, ART: 11 (ref 0–2)
NRBC AUTOMATED: 0.9 PER 100 WBC
O2 DEVICE/FLOW/%: ABNORMAL
PATIENT TEMP: ABNORMAL
PDW BLD-RTO: 39.2 % (ref 11.8–14.4)
PLATELET # BLD: 101 K/UL (ref 138–453)
PMV BLD AUTO: ABNORMAL FL (ref 8.1–13.5)
POC HCO3: 13.7 MMOL/L (ref 22–27)
POC O2 SATURATION: 93 %
POC PCO2 TEMP: ABNORMAL MM HG
POC PCO2: 22 MM HG (ref 32–45)
POC PH TEMP: ABNORMAL
POC PH: 7.41 (ref 7.35–7.45)
POC PO2 TEMP: ABNORMAL MM HG
POC PO2: 65 MM HG (ref 75–95)
POSITIVE BASE EXCESS, ART: ABNORMAL (ref 0–2)
POTASSIUM SERPL-SCNC: 4.2 MMOL/L (ref 3.7–5.3)
PRO-BNP: 1122 PG/ML
RBC # BLD: 2.82 M/UL (ref 4.21–5.77)
REASON FOR REJECTION: NORMAL
REASON FOR REJECTION: NORMAL
SODIUM BLD-SCNC: 135 MMOL/L (ref 135–144)
TCO2 (CALC), ART: 14 MMOL/L (ref 23–28)
WBC # BLD: 10.4 K/UL (ref 3.5–11.3)
ZZ NTE CLEAN UP: ORDERED TEST: NORMAL
ZZ NTE CLEAN UP: ORDERED TEST: NORMAL
ZZ NTE WITH NAME CLEAN UP: SPECIMEN SOURCE: NORMAL
ZZ NTE WITH NAME CLEAN UP: SPECIMEN SOURCE: NORMAL

## 2020-02-01 PROCEDURE — 71045 X-RAY EXAM CHEST 1 VIEW: CPT

## 2020-02-01 PROCEDURE — 83880 ASSAY OF NATRIURETIC PEPTIDE: CPT

## 2020-02-01 PROCEDURE — 86920 COMPATIBILITY TEST SPIN: CPT

## 2020-02-01 PROCEDURE — 36430 TRANSFUSION BLD/BLD COMPNT: CPT

## 2020-02-01 PROCEDURE — 82803 BLOOD GASES ANY COMBINATION: CPT

## 2020-02-01 PROCEDURE — 6360000004 HC RX CONTRAST MEDICATION: Performed by: INTERNAL MEDICINE

## 2020-02-01 PROCEDURE — 36415 COLL VENOUS BLD VENIPUNCTURE: CPT

## 2020-02-01 PROCEDURE — 85014 HEMATOCRIT: CPT

## 2020-02-01 PROCEDURE — P9016 RBC LEUKOCYTES REDUCED: HCPCS

## 2020-02-01 PROCEDURE — 86901 BLOOD TYPING SEROLOGIC RH(D): CPT

## 2020-02-01 PROCEDURE — 2580000003 HC RX 258: Performed by: FAMILY MEDICINE

## 2020-02-01 PROCEDURE — 86850 RBC ANTIBODY SCREEN: CPT

## 2020-02-01 PROCEDURE — 1200000000 HC SEMI PRIVATE

## 2020-02-01 PROCEDURE — 2580000003 HC RX 258: Performed by: NURSE PRACTITIONER

## 2020-02-01 PROCEDURE — 94664 DEMO&/EVAL PT USE INHALER: CPT

## 2020-02-01 PROCEDURE — 2709999900 CT CHEST PULMONARY EMBOLISM W CONTRAST

## 2020-02-01 PROCEDURE — 86900 BLOOD TYPING SEROLOGIC ABO: CPT

## 2020-02-01 PROCEDURE — 85018 HEMOGLOBIN: CPT

## 2020-02-01 PROCEDURE — 94761 N-INVAS EAR/PLS OXIMETRY MLT: CPT

## 2020-02-01 PROCEDURE — 6370000000 HC RX 637 (ALT 250 FOR IP): Performed by: INTERNAL MEDICINE

## 2020-02-01 PROCEDURE — 94640 AIRWAY INHALATION TREATMENT: CPT

## 2020-02-01 PROCEDURE — 85379 FIBRIN DEGRADATION QUANT: CPT

## 2020-02-01 PROCEDURE — 94150 VITAL CAPACITY TEST: CPT

## 2020-02-01 PROCEDURE — 2580000003 HC RX 258: Performed by: INTERNAL MEDICINE

## 2020-02-01 PROCEDURE — 85027 COMPLETE CBC AUTOMATED: CPT

## 2020-02-01 PROCEDURE — 6360000002 HC RX W HCPCS: Performed by: FAMILY MEDICINE

## 2020-02-01 PROCEDURE — 36600 WITHDRAWAL OF ARTERIAL BLOOD: CPT

## 2020-02-01 PROCEDURE — 99232 SBSQ HOSP IP/OBS MODERATE 35: CPT | Performed by: FAMILY MEDICINE

## 2020-02-01 PROCEDURE — 80048 BASIC METABOLIC PNL TOTAL CA: CPT

## 2020-02-01 PROCEDURE — 6360000002 HC RX W HCPCS: Performed by: NURSE PRACTITIONER

## 2020-02-01 RX ORDER — ALBUTEROL SULFATE 2.5 MG/3ML
2.5 SOLUTION RESPIRATORY (INHALATION)
Status: DISCONTINUED | OUTPATIENT
Start: 2020-02-01 | End: 2020-02-07 | Stop reason: HOSPADM

## 2020-02-01 RX ORDER — FERROUS SULFATE 325(65) MG
325 TABLET ORAL
Status: DISCONTINUED | OUTPATIENT
Start: 2020-02-01 | End: 2020-02-07 | Stop reason: HOSPADM

## 2020-02-01 RX ORDER — SODIUM CHLORIDE FOR INHALATION 0.9 %
3 VIAL, NEBULIZER (ML) INHALATION
Status: DISCONTINUED | OUTPATIENT
Start: 2020-02-01 | End: 2020-02-07 | Stop reason: HOSPADM

## 2020-02-01 RX ORDER — PHENAZOPYRIDINE HYDROCHLORIDE 100 MG/1
200 TABLET, FILM COATED ORAL
Status: DISCONTINUED | OUTPATIENT
Start: 2020-02-01 | End: 2020-02-07 | Stop reason: HOSPADM

## 2020-02-01 RX ORDER — 0.9 % SODIUM CHLORIDE 0.9 %
250 INTRAVENOUS SOLUTION INTRAVENOUS ONCE
Status: COMPLETED | OUTPATIENT
Start: 2020-02-01 | End: 2020-02-01

## 2020-02-01 RX ADMIN — SODIUM CHLORIDE, PRESERVATIVE FREE 10 ML: 5 INJECTION INTRAVENOUS at 09:30

## 2020-02-01 RX ADMIN — FERROUS SULFATE TAB 325 MG (65 MG ELEMENTAL FE) 325 MG: 325 (65 FE) TAB at 12:14

## 2020-02-01 RX ADMIN — TAMSULOSIN HYDROCHLORIDE 0.4 MG: 0.4 CAPSULE ORAL at 08:42

## 2020-02-01 RX ADMIN — IOPAMIDOL 80 ML: 755 INJECTION, SOLUTION INTRAVENOUS at 08:17

## 2020-02-01 RX ADMIN — AMPICILLIN SODIUM AND SULBACTAM SODIUM 1.5 G: 1; .5 INJECTION, POWDER, FOR SOLUTION INTRAMUSCULAR; INTRAVENOUS at 17:10

## 2020-02-01 RX ADMIN — SODIUM CHLORIDE, PRESERVATIVE FREE 10 ML: 5 INJECTION INTRAVENOUS at 22:19

## 2020-02-01 RX ADMIN — PHENAZOPYRIDINE HYDROCHLORIDE 200 MG: 100 TABLET ORAL at 17:10

## 2020-02-01 RX ADMIN — AMPICILLIN SODIUM AND SULBACTAM SODIUM 1.5 G: 1; .5 INJECTION, POWDER, FOR SOLUTION INTRAMUSCULAR; INTRAVENOUS at 22:57

## 2020-02-01 RX ADMIN — ACETAMINOPHEN 650 MG: 325 TABLET ORAL at 11:14

## 2020-02-01 RX ADMIN — SODIUM CHLORIDE: 9 INJECTION, SOLUTION INTRAVENOUS at 02:41

## 2020-02-01 RX ADMIN — FERROUS SULFATE TAB 325 MG (65 MG ELEMENTAL FE) 325 MG: 325 (65 FE) TAB at 17:10

## 2020-02-01 RX ADMIN — ALBUTEROL SULFATE 2.5 MG: 2.5 SOLUTION RESPIRATORY (INHALATION) at 04:27

## 2020-02-01 RX ADMIN — AMPICILLIN SODIUM AND SULBACTAM SODIUM 1.5 G: 1; .5 INJECTION, POWDER, FOR SOLUTION INTRAMUSCULAR; INTRAVENOUS at 12:56

## 2020-02-01 RX ADMIN — SODIUM CHLORIDE 250 ML: 9 INJECTION, SOLUTION INTRAVENOUS at 09:29

## 2020-02-01 RX ADMIN — PHENAZOPYRIDINE HYDROCHLORIDE 200 MG: 100 TABLET ORAL at 12:14

## 2020-02-01 ASSESSMENT — PAIN SCALES - GENERAL
PAINLEVEL_OUTOF10: 7
PAINLEVEL_OUTOF10: 0
PAINLEVEL_OUTOF10: 0

## 2020-02-01 NOTE — PROGRESS NOTES
Incentive Spirometry education and demonstration given by Respiratory Therapy. Pt achieving 2500 mL at time of instruction. Incentive Spirometer left at bedside and   Patient instructed to do a minimum of 10 breaths every hour.       Airam Lopez  5:38 AM

## 2020-02-01 NOTE — PROGRESS NOTES
Hospitalist Progress Note  2/1/2020 12:05 PM  Subjective:   Admit Date: 1/31/2020  PCP: Caleb LANG MD    Interval History:Patient with recurrent Aerococcus UTI,switched to Unasyn this morning. Elevated D dimer,CT chest negative  For PE ,possible left LL pneumonia. recieving transfusion for anemia being worked up as outpatient. No blood in stools. Mild fluid overload yesterday is improved. no chest pain or SOB. Diet: DIET GENERAL;  Medications:   Scheduled Meds:   ampicillin-sulbactam  1.5 g Intravenous Q6H    ferrous sulfate  325 mg Oral TID WC    phenazopyridine  200 mg Oral TID WC    sodium chloride flush  10 mL Intravenous 2 times per day    nicotine  1 patch Transdermal Daily    tamsulosin  0.4 mg Oral Daily     Continuous Infusions:    Patient's current medications documented, reviewed, and updated. CBC:   Recent Labs     01/31/20  0929 02/01/20  0538   WBC 12.4* 10.4   HGB 7.6* 6.7*   * 101*     BMP:    Recent Labs     01/31/20  0929 02/01/20  0425    135   K 3.9 4.2    102   CO2 21 16*   BUN 20 15   CREATININE 0.82 0.72   GLUCOSE 168* 134*     Hepatic: No results for input(s): AST, ALT, ALB, BILITOT, ALKPHOS in the last 72 hours. Troponin: No results for input(s): TROPONINI in the last 72 hours. BNP: No results for input(s): BNP in the last 72 hours. Lipids: No results for input(s): CHOL, HDL in the last 72 hours. Invalid input(s): LDLCALCU  INR: No results for input(s): INR in the last 72 hours. Objective:   Vitals: BP 97/61   Pulse 70   Temp 99 °F (37.2 °C)   Resp 16   Ht 6' (1.829 m)   Wt 226 lb 3.2 oz (102.6 kg)   SpO2 94%   BMI 30.68 kg/m²   General appearance: alert and cooperative with exam  HEENT: Head: Normal, normocephalic, atraumatic.   Neck: no adenopathy, no carotid bruit, no JVD, supple, symmetrical, trachea midline and thyroid not enlarged, symmetric, no tenderness/mass/nodules  Lungs: clear to auscultation bilaterally  Heart: regular rate and rhythm,

## 2020-02-01 NOTE — PLAN OF CARE
Problem: Sensory:  Goal: General experience of comfort will improve  Description  General experience of comfort will improve  2/1/2020 0854 by Serena Navas RN  Outcome: Ongoing  1/31/2020 2228 by Marleen Aschoff, RN  Outcome: Ongoing     Problem: Urinary Elimination:  Goal: Signs and symptoms of infection will decrease  Description  Signs and symptoms of infection will decrease  2/1/2020 0854 by Serena Navas RN  Outcome: Ongoing  1/31/2020 2228 by Marleen Aschoff, RN  Outcome: Ongoing  Goal: Ability to reestablish a normal urinary elimination pattern will improve - after catheter removal  Description  Ability to reestablish a normal urinary elimination pattern will improve  2/1/2020 0854 by Serena Navas RN  Outcome: Ongoing  1/31/2020 2228 by Marleen Aschoff, RN  Outcome: Ongoing  Goal: Complications related to the disease process, condition or treatment will be avoided or minimized  Description  Complications related to the disease process, condition or treatment will be avoided or minimized  2/1/2020 0854 by Serena Navas RN  Outcome: Ongoing  1/31/2020 2228 by Marleen Aschoff, RN  Outcome: Ongoing     Problem: Falls - Risk of:  Goal: Will remain free from falls  Description  Will remain free from falls  2/1/2020 0854 by Serena Navas RN  Outcome: Ongoing  1/31/2020 2228 by Marleen Aschoff, RN  Outcome: Ongoing  Goal: Absence of physical injury  Description  Absence of physical injury  2/1/2020 0854 by Serena Navas RN  Outcome: Ongoing  1/31/2020 2228 by Marleen Aschoff, RN  Outcome: Ongoing     Problem: Discharge Planning:  Goal: Discharged to appropriate level of care  Description  Discharged to appropriate level of care  2/1/2020 0854 by Serena Navas RN  Outcome: Ongoing  1/31/2020 2228 by Marleen Aschoff, RN  Outcome: Ongoing

## 2020-02-01 NOTE — FLOWSHEET NOTE
Pt ambulated to bathroom. Had a bowel movement. Upon returning from bathroom he became SOB and was having chills. Patient was afebrile. Gomez Crystal NP notified via perfect serve. Orders received. Fluids held per Nurse Practitioner. ABG, Chest xray, and labs ordered. Will continue to monitor patient closely.

## 2020-02-02 ENCOUNTER — APPOINTMENT (OUTPATIENT)
Dept: GENERAL RADIOLOGY | Age: 70
DRG: 654 | End: 2020-02-02
Payer: MEDICARE

## 2020-02-02 LAB
ABO/RH: NORMAL
ABSOLUTE BANDS #: 0.61 K/UL
ABSOLUTE EOS #: 0 K/UL (ref 0–0.4)
ABSOLUTE IMMATURE GRANULOCYTE: 0 K/UL (ref 0–0.3)
ABSOLUTE LYMPH #: 1.16 K/UL (ref 1–4.8)
ABSOLUTE MONO #: 0.27 K/UL (ref 0.1–1.2)
ANION GAP SERPL CALCULATED.3IONS-SCNC: 12 MMOL/L (ref 9–17)
ANTIBODY SCREEN: NEGATIVE
ARM BAND NUMBER: NORMAL
BANDS: 9 %
BASOPHILS # BLD: 0 % (ref 0–2)
BASOPHILS ABSOLUTE: 0 K/UL (ref 0–0.2)
BLD PROD TYP BPU: NORMAL
BUN BLDV-MCNC: 9 MG/DL (ref 8–23)
BUN/CREAT BLD: 15 (ref 9–20)
CALCIUM SERPL-MCNC: 8.1 MG/DL (ref 8.6–10.4)
CHLORIDE BLD-SCNC: 106 MMOL/L (ref 98–107)
CO2: 19 MMOL/L (ref 20–31)
CREAT SERPL-MCNC: 0.59 MG/DL (ref 0.7–1.2)
CROSSMATCH RESULT: NORMAL
CULTURE: NORMAL
DIFFERENTIAL TYPE: ABNORMAL
DISPENSE STATUS BLOOD BANK: NORMAL
EOSINOPHILS RELATIVE PERCENT: 0 % (ref 1–8)
EXPIRATION DATE: NORMAL
GFR AFRICAN AMERICAN: >60 ML/MIN
GFR NON-AFRICAN AMERICAN: >60 ML/MIN
GFR SERPL CREATININE-BSD FRML MDRD: ABNORMAL ML/MIN/{1.73_M2}
GFR SERPL CREATININE-BSD FRML MDRD: ABNORMAL ML/MIN/{1.73_M2}
GLUCOSE BLD-MCNC: 104 MG/DL (ref 70–99)
HCT VFR BLD CALC: 23.6 % (ref 40.7–50.3)
HCT VFR BLD CALC: 23.7 % (ref 40.7–50.3)
HCT VFR BLD CALC: 24.6 % (ref 40.7–50.3)
HCT VFR BLD CALC: 24.8 % (ref 40.7–50.3)
HEMOGLOBIN: 7.2 G/DL (ref 13–17)
HEMOGLOBIN: 7.4 G/DL (ref 13–17)
HEMOGLOBIN: 7.5 G/DL (ref 13–17)
HEMOGLOBIN: 7.6 G/DL (ref 13–17)
IMMATURE GRANULOCYTES: 0 %
LYMPHOCYTES # BLD: 17 % (ref 15–43)
Lab: NORMAL
MCH RBC QN AUTO: 24.2 PG (ref 25.2–33.5)
MCHC RBC AUTO-ENTMCNC: 30.5 G/DL (ref 25.2–33.5)
MCV RBC AUTO: 79.2 FL (ref 82.6–102.9)
METAMYELOCYTES ABSOLUTE COUNT: 0.14 K/UL
METAMYELOCYTES: 2 %
MONOCYTES # BLD: 4 % (ref 6–14)
MORPHOLOGY: ABNORMAL
NRBC AUTOMATED: 0.9 PER 100 WBC
PDW BLD-RTO: 37.1 % (ref 11.8–14.4)
PLATELET # BLD: 84 K/UL (ref 138–453)
PLATELET ESTIMATE: ABNORMAL
PMV BLD AUTO: ABNORMAL FL (ref 8.1–13.5)
POTASSIUM SERPL-SCNC: 3.5 MMOL/L (ref 3.7–5.3)
RBC # BLD: 2.98 M/UL (ref 4.21–5.77)
RBC # BLD: ABNORMAL 10*6/UL
SEG NEUTROPHILS: 68 % (ref 44–74)
SEGMENTED NEUTROPHILS ABSOLUTE COUNT: 4.62 K/UL (ref 1.8–7.7)
SODIUM BLD-SCNC: 137 MMOL/L (ref 135–144)
SPECIMEN DESCRIPTION: NORMAL
TRANSFUSION STATUS: NORMAL
UNIT DIVISION: 0
UNIT NUMBER: NORMAL
WBC # BLD: 6.8 K/UL (ref 3.5–11.3)
WBC # BLD: ABNORMAL 10*3/UL

## 2020-02-02 PROCEDURE — 6360000002 HC RX W HCPCS: Performed by: FAMILY MEDICINE

## 2020-02-02 PROCEDURE — 85018 HEMOGLOBIN: CPT

## 2020-02-02 PROCEDURE — 85025 COMPLETE CBC W/AUTO DIFF WBC: CPT

## 2020-02-02 PROCEDURE — 71046 X-RAY EXAM CHEST 2 VIEWS: CPT

## 2020-02-02 PROCEDURE — 99232 SBSQ HOSP IP/OBS MODERATE 35: CPT | Performed by: FAMILY MEDICINE

## 2020-02-02 PROCEDURE — 1200000000 HC SEMI PRIVATE

## 2020-02-02 PROCEDURE — 94761 N-INVAS EAR/PLS OXIMETRY MLT: CPT

## 2020-02-02 PROCEDURE — 36415 COLL VENOUS BLD VENIPUNCTURE: CPT

## 2020-02-02 PROCEDURE — 80048 BASIC METABOLIC PNL TOTAL CA: CPT

## 2020-02-02 PROCEDURE — 85014 HEMATOCRIT: CPT

## 2020-02-02 PROCEDURE — 6370000000 HC RX 637 (ALT 250 FOR IP): Performed by: INTERNAL MEDICINE

## 2020-02-02 PROCEDURE — 2580000003 HC RX 258: Performed by: INTERNAL MEDICINE

## 2020-02-02 PROCEDURE — 2580000003 HC RX 258: Performed by: FAMILY MEDICINE

## 2020-02-02 RX ORDER — DIPHENHYDRAMINE HCL 25 MG
25 TABLET ORAL EVERY 6 HOURS PRN
Status: DISCONTINUED | OUTPATIENT
Start: 2020-02-02 | End: 2020-02-07 | Stop reason: HOSPADM

## 2020-02-02 RX ORDER — AMPICILLIN AND SULBACTAM 1; .5 G/1; G/1
INJECTION, POWDER, FOR SOLUTION INTRAMUSCULAR; INTRAVENOUS
Status: DISPENSED
Start: 2020-02-02 | End: 2020-02-03

## 2020-02-02 RX ADMIN — ACETAMINOPHEN 650 MG: 325 TABLET ORAL at 11:08

## 2020-02-02 RX ADMIN — SODIUM CHLORIDE, PRESERVATIVE FREE 10 ML: 5 INJECTION INTRAVENOUS at 09:21

## 2020-02-02 RX ADMIN — FERROUS SULFATE TAB 325 MG (65 MG ELEMENTAL FE) 325 MG: 325 (65 FE) TAB at 12:05

## 2020-02-02 RX ADMIN — SODIUM CHLORIDE, PRESERVATIVE FREE 10 ML: 5 INJECTION INTRAVENOUS at 21:36

## 2020-02-02 RX ADMIN — ACETAMINOPHEN 650 MG: 325 TABLET ORAL at 04:39

## 2020-02-02 RX ADMIN — AMPICILLIN SODIUM AND SULBACTAM SODIUM 1.5 G: 1; .5 INJECTION, POWDER, FOR SOLUTION INTRAMUSCULAR; INTRAVENOUS at 23:59

## 2020-02-02 RX ADMIN — PHENAZOPYRIDINE HYDROCHLORIDE 200 MG: 100 TABLET ORAL at 12:05

## 2020-02-02 RX ADMIN — FERROUS SULFATE TAB 325 MG (65 MG ELEMENTAL FE) 325 MG: 325 (65 FE) TAB at 17:33

## 2020-02-02 RX ADMIN — ACETAMINOPHEN 650 MG: 325 TABLET ORAL at 18:34

## 2020-02-02 RX ADMIN — PHENAZOPYRIDINE HYDROCHLORIDE 200 MG: 100 TABLET ORAL at 17:33

## 2020-02-02 RX ADMIN — TAMSULOSIN HYDROCHLORIDE 0.4 MG: 0.4 CAPSULE ORAL at 09:18

## 2020-02-02 RX ADMIN — PHENAZOPYRIDINE HYDROCHLORIDE 200 MG: 100 TABLET ORAL at 09:18

## 2020-02-02 RX ADMIN — Medication 10 ML: at 04:40

## 2020-02-02 RX ADMIN — AMPICILLIN SODIUM AND SULBACTAM SODIUM 1.5 G: 1; .5 INJECTION, POWDER, FOR SOLUTION INTRAMUSCULAR; INTRAVENOUS at 17:33

## 2020-02-02 RX ADMIN — AMPICILLIN SODIUM AND SULBACTAM SODIUM 1.5 G: 1; .5 INJECTION, POWDER, FOR SOLUTION INTRAMUSCULAR; INTRAVENOUS at 04:40

## 2020-02-02 RX ADMIN — AMPICILLIN SODIUM AND SULBACTAM SODIUM 1.5 G: 1; .5 INJECTION, POWDER, FOR SOLUTION INTRAMUSCULAR; INTRAVENOUS at 12:06

## 2020-02-02 RX ADMIN — FERROUS SULFATE TAB 325 MG (65 MG ELEMENTAL FE) 325 MG: 325 (65 FE) TAB at 09:18

## 2020-02-02 ASSESSMENT — PAIN SCALES - GENERAL
PAINLEVEL_OUTOF10: 0
PAINLEVEL_OUTOF10: 0
PAINLEVEL_OUTOF10: 6
PAINLEVEL_OUTOF10: 6
PAINLEVEL_OUTOF10: 0

## 2020-02-02 NOTE — PLAN OF CARE
Problem: Sensory:  Goal: General experience of comfort will improve  Description  General experience of comfort will improve  2/1/2020 2222 by Parker Gil RN  Outcome: Ongoing  2/1/2020 0854 by Yesica Vasquez RN  Outcome: Ongoing     Problem: Urinary Elimination:  Goal: Signs and symptoms of infection will decrease  Description  Signs and symptoms of infection will decrease  2/1/2020 2222 by Parker Gil RN  Outcome: Ongoing  2/1/2020 0854 by Yesica Vasquez RN  Outcome: Ongoing  Goal: Ability to reestablish a normal urinary elimination pattern will improve - after catheter removal  Description  Ability to reestablish a normal urinary elimination pattern will improve  2/1/2020 2222 by Parker Gil RN  Outcome: Ongoing  2/1/2020 0854 by Yesica Vasquez RN  Outcome: Ongoing  Goal: Complications related to the disease process, condition or treatment will be avoided or minimized  Description  Complications related to the disease process, condition or treatment will be avoided or minimized  2/1/2020 2222 by Parker Gil RN  Outcome: Ongoing  2/1/2020 0854 by Yesica Vasquez RN  Outcome: Ongoing     Problem: Falls - Risk of:  Goal: Will remain free from falls  Description  Will remain free from falls  2/1/2020 2222 by Parker Gil RN  Outcome: Ongoing  2/1/2020 0854 by Yesica Vasquez RN  Outcome: Ongoing  Goal: Absence of physical injury  Description  Absence of physical injury  2/1/2020 2222 by Parker Gil RN  Outcome: Ongoing  2/1/2020 0854 by Yesica Vasquez RN  Outcome: Ongoing     Problem: Discharge Planning:  Goal: Discharged to appropriate level of care  Description  Discharged to appropriate level of care  2/1/2020 2222 by Parker Gil RN  Outcome: Ongoing  2/1/2020 0854 by Yesica Vasquez RN  Outcome: Ongoing

## 2020-02-02 NOTE — PROGRESS NOTES
Hospitalist Progress Note  2/2/2020 12:11 PM  Subjective:   Admit Date: 1/31/2020  PCP: Selena LANG MD    Interval History:Patient with recurrent Aerococcus UTI,switched to Unasyn yesterday,low grade fever . Post transfusion hgb staying above 7. 0. CXR this morning no evidence of pneumonia. Skin rash in back with minimal itching. Has had one 1/3 blood culture positive suspect skin contamination. Diet: DIET GENERAL;  Medications:   Scheduled Meds:   ampicillin-sulbactam  1.5 g Intravenous Q6H    ferrous sulfate  325 mg Oral TID WC    phenazopyridine  200 mg Oral TID WC    sodium chloride flush  10 mL Intravenous 2 times per day    nicotine  1 patch Transdermal Daily    tamsulosin  0.4 mg Oral Daily     Continuous Infusions:    Patient's current medications documented, reviewed, and updated. CBC:   Recent Labs     01/31/20  0929 02/01/20  0538 02/01/20  1403 02/01/20  2355 02/02/20  0556   WBC 12.4* 10.4  --   --  6.8   HGB 7.6* 6.7* 7.1* 7.4* 7.2*   * 101*  --   --  84*     BMP:    Recent Labs     01/31/20  0929 02/01/20  0425 02/02/20  0556    135 137   K 3.9 4.2 3.5*    102 106   CO2 21 16* 19*   BUN 20 15 9   CREATININE 0.82 0.72 0.59*   GLUCOSE 168* 134* 104*     Hepatic: No results for input(s): AST, ALT, ALB, BILITOT, ALKPHOS in the last 72 hours. Troponin: No results for input(s): TROPONINI in the last 72 hours. BNP: No results for input(s): BNP in the last 72 hours. Lipids: No results for input(s): CHOL, HDL in the last 72 hours. Invalid input(s): LDLCALCU  INR: No results for input(s): INR in the last 72 hours. Objective:   Vitals: BP (!) 125/57   Pulse 64   Temp 99.9 °F (37.7 °C) (Tympanic)   Resp 18   Ht 6' (1.829 m)   Wt 226 lb 3.2 oz (102.6 kg)   SpO2 92%   BMI 30.68 kg/m²   General appearance: alert and cooperative with exam  HEENT: Head: Normal, normocephalic, atraumatic.   Neck: no adenopathy, no carotid bruit, no JVD, supple, symmetrical, trachea midline

## 2020-02-03 ENCOUNTER — APPOINTMENT (OUTPATIENT)
Dept: CT IMAGING | Age: 70
DRG: 654 | End: 2020-02-03
Payer: MEDICARE

## 2020-02-03 ENCOUNTER — APPOINTMENT (OUTPATIENT)
Dept: GENERAL RADIOLOGY | Age: 70
DRG: 654 | End: 2020-02-03
Payer: MEDICARE

## 2020-02-03 PROBLEM — N39.0 RECURRENT UTI: Status: ACTIVE | Noted: 2020-02-03

## 2020-02-03 PROBLEM — Q54.9 HYPOSPADIAS: Status: ACTIVE | Noted: 2020-02-03

## 2020-02-03 LAB
ABSOLUTE BANDS #: 0.61 K/UL
ABSOLUTE EOS #: 0 K/UL (ref 0–0.4)
ABSOLUTE IMMATURE GRANULOCYTE: 0 K/UL (ref 0–0.3)
ABSOLUTE LYMPH #: 1.22 K/UL (ref 1–4.8)
ABSOLUTE MONO #: 0.31 K/UL (ref 0.1–1.2)
ANION GAP SERPL CALCULATED.3IONS-SCNC: 12 MMOL/L (ref 9–17)
BANDS: 8 %
BASOPHILS # BLD: 0 % (ref 0–2)
BASOPHILS ABSOLUTE: 0 K/UL (ref 0–0.2)
BUN BLDV-MCNC: 8 MG/DL (ref 8–23)
BUN/CREAT BLD: 15 (ref 9–20)
CALCIUM SERPL-MCNC: 8.3 MG/DL (ref 8.6–10.4)
CHLORIDE BLD-SCNC: 102 MMOL/L (ref 98–107)
CO2: 21 MMOL/L (ref 20–31)
CREAT SERPL-MCNC: 0.55 MG/DL (ref 0.7–1.2)
DIFFERENTIAL TYPE: ABNORMAL
EKG ATRIAL RATE: 91 BPM
EKG P AXIS: -2 DEGREES
EKG P-R INTERVAL: 156 MS
EKG Q-T INTERVAL: 356 MS
EKG QRS DURATION: 104 MS
EKG QTC CALCULATION (BAZETT): 437 MS
EKG R AXIS: 20 DEGREES
EKG T AXIS: 33 DEGREES
EKG VENTRICULAR RATE: 91 BPM
EOSINOPHILS RELATIVE PERCENT: 0 % (ref 1–8)
GFR AFRICAN AMERICAN: >60 ML/MIN
GFR NON-AFRICAN AMERICAN: >60 ML/MIN
GFR SERPL CREATININE-BSD FRML MDRD: ABNORMAL ML/MIN/{1.73_M2}
GFR SERPL CREATININE-BSD FRML MDRD: ABNORMAL ML/MIN/{1.73_M2}
GLUCOSE BLD-MCNC: 114 MG/DL (ref 70–99)
HCT VFR BLD CALC: 24.5 % (ref 40.7–50.3)
HCT VFR BLD CALC: 25.7 % (ref 40.7–50.3)
HEMOGLOBIN: 7.5 G/DL (ref 13–17)
HEMOGLOBIN: 7.7 G/DL (ref 13–17)
IMMATURE GRANULOCYTES: 0 %
LV EF: 53 %
LVEF MODALITY: NORMAL
LYMPHOCYTES # BLD: 16 % (ref 15–43)
MCH RBC QN AUTO: 24 PG (ref 25.2–33.5)
MCHC RBC AUTO-ENTMCNC: 30 G/DL (ref 25.2–33.5)
MCV RBC AUTO: 80.1 FL (ref 82.6–102.9)
METAMYELOCYTES ABSOLUTE COUNT: 0.31 K/UL
METAMYELOCYTES: 4 %
MONOCYTES # BLD: 4 % (ref 6–14)
MORPHOLOGY: ABNORMAL
NRBC AUTOMATED: 0.3 PER 100 WBC
NUCLEATED RED BLOOD CELLS: 1 PER 100 WBC
PDW BLD-RTO: 37.7 % (ref 11.8–14.4)
PLATELET # BLD: 81 K/UL (ref 138–453)
PLATELET ESTIMATE: ABNORMAL
PMV BLD AUTO: ABNORMAL FL (ref 8.1–13.5)
POTASSIUM SERPL-SCNC: 3.6 MMOL/L (ref 3.7–5.3)
RBC # BLD: 3.21 M/UL (ref 4.21–5.77)
RBC # BLD: ABNORMAL 10*6/UL
SEG NEUTROPHILS: 68 % (ref 44–74)
SEGMENTED NEUTROPHILS ABSOLUTE COUNT: 5.17 K/UL (ref 1.8–7.7)
SODIUM BLD-SCNC: 135 MMOL/L (ref 135–144)
WBC # BLD: 7.6 K/UL (ref 3.5–11.3)
WBC # BLD: ABNORMAL 10*3/UL

## 2020-02-03 PROCEDURE — 36415 COLL VENOUS BLD VENIPUNCTURE: CPT

## 2020-02-03 PROCEDURE — 6360000002 HC RX W HCPCS: Performed by: INTERNAL MEDICINE

## 2020-02-03 PROCEDURE — 99222 1ST HOSP IP/OBS MODERATE 55: CPT | Performed by: INTERNAL MEDICINE

## 2020-02-03 PROCEDURE — 6360000002 HC RX W HCPCS: Performed by: UROLOGY

## 2020-02-03 PROCEDURE — 1200000000 HC SEMI PRIVATE

## 2020-02-03 PROCEDURE — 3600000012 HC SURGERY LEVEL 2 ADDTL 15MIN: Performed by: UROLOGY

## 2020-02-03 PROCEDURE — 74177 CT ABD & PELVIS W/CONTRAST: CPT

## 2020-02-03 PROCEDURE — 6370000000 HC RX 637 (ALT 250 FOR IP): Performed by: UROLOGY

## 2020-02-03 PROCEDURE — 3600000002 HC SURGERY LEVEL 2 BASE: Performed by: UROLOGY

## 2020-02-03 PROCEDURE — 6370000000 HC RX 637 (ALT 250 FOR IP): Performed by: INTERNAL MEDICINE

## 2020-02-03 PROCEDURE — 2709999900 HC NON-CHARGEABLE SUPPLY: Performed by: UROLOGY

## 2020-02-03 PROCEDURE — 2580000003 HC RX 258: Performed by: INTERNAL MEDICINE

## 2020-02-03 PROCEDURE — 71046 X-RAY EXAM CHEST 2 VIEWS: CPT

## 2020-02-03 PROCEDURE — 85025 COMPLETE CBC W/AUTO DIFF WBC: CPT

## 2020-02-03 PROCEDURE — 2720000010 HC SURG SUPPLY STERILE: Performed by: UROLOGY

## 2020-02-03 PROCEDURE — 80048 BASIC METABOLIC PNL TOTAL CA: CPT

## 2020-02-03 PROCEDURE — 2580000003 HC RX 258: Performed by: FAMILY MEDICINE

## 2020-02-03 PROCEDURE — 6360000004 HC RX CONTRAST MEDICATION: Performed by: INTERNAL MEDICINE

## 2020-02-03 PROCEDURE — 2580000003 HC RX 258: Performed by: UROLOGY

## 2020-02-03 PROCEDURE — 94762 N-INVAS EAR/PLS OXIMTRY CONT: CPT

## 2020-02-03 PROCEDURE — 94760 N-INVAS EAR/PLS OXIMETRY 1: CPT

## 2020-02-03 PROCEDURE — 6360000002 HC RX W HCPCS: Performed by: FAMILY MEDICINE

## 2020-02-03 PROCEDURE — 99232 SBSQ HOSP IP/OBS MODERATE 35: CPT | Performed by: INTERNAL MEDICINE

## 2020-02-03 PROCEDURE — 93306 TTE W/DOPPLER COMPLETE: CPT

## 2020-02-03 PROCEDURE — 0T7B8ZZ DILATION OF BLADDER, VIA NATURAL OR ARTIFICIAL OPENING ENDOSCOPIC: ICD-10-PCS | Performed by: UROLOGY

## 2020-02-03 PROCEDURE — 93005 ELECTROCARDIOGRAM TRACING: CPT

## 2020-02-03 PROCEDURE — C1769 GUIDE WIRE: HCPCS | Performed by: UROLOGY

## 2020-02-03 RX ORDER — FUROSEMIDE 10 MG/ML
40 INJECTION INTRAMUSCULAR; INTRAVENOUS 2 TIMES DAILY
Status: DISCONTINUED | OUTPATIENT
Start: 2020-02-03 | End: 2020-02-04

## 2020-02-03 RX ORDER — TRAMADOL HYDROCHLORIDE 50 MG/1
50 TABLET ORAL EVERY 6 HOURS PRN
Status: DISCONTINUED | OUTPATIENT
Start: 2020-02-03 | End: 2020-02-07 | Stop reason: HOSPADM

## 2020-02-03 RX ORDER — FUROSEMIDE 10 MG/ML
40 INJECTION INTRAMUSCULAR; INTRAVENOUS ONCE
Status: COMPLETED | OUTPATIENT
Start: 2020-02-03 | End: 2020-02-03

## 2020-02-03 RX ORDER — LIDOCAINE HYDROCHLORIDE 20 MG/ML
JELLY TOPICAL PRN
Status: DISCONTINUED | OUTPATIENT
Start: 2020-02-03 | End: 2020-02-03 | Stop reason: ALTCHOICE

## 2020-02-03 RX ADMIN — PHENAZOPYRIDINE HYDROCHLORIDE 200 MG: 100 TABLET ORAL at 12:11

## 2020-02-03 RX ADMIN — TAMSULOSIN HYDROCHLORIDE 0.4 MG: 0.4 CAPSULE ORAL at 07:52

## 2020-02-03 RX ADMIN — AMPICILLIN SODIUM AND SULBACTAM SODIUM 1.5 G: 1; .5 INJECTION, POWDER, FOR SOLUTION INTRAMUSCULAR; INTRAVENOUS at 17:53

## 2020-02-03 RX ADMIN — FERROUS SULFATE TAB 325 MG (65 MG ELEMENTAL FE) 325 MG: 325 (65 FE) TAB at 17:53

## 2020-02-03 RX ADMIN — PHENAZOPYRIDINE HYDROCHLORIDE 200 MG: 100 TABLET ORAL at 07:52

## 2020-02-03 RX ADMIN — TRAMADOL HYDROCHLORIDE 50 MG: 50 TABLET, FILM COATED ORAL at 09:41

## 2020-02-03 RX ADMIN — PHENAZOPYRIDINE HYDROCHLORIDE 200 MG: 100 TABLET ORAL at 17:53

## 2020-02-03 RX ADMIN — AMPICILLIN SODIUM AND SULBACTAM SODIUM 1.5 G: 1; .5 INJECTION, POWDER, FOR SOLUTION INTRAMUSCULAR; INTRAVENOUS at 05:28

## 2020-02-03 RX ADMIN — ACETAMINOPHEN 650 MG: 325 TABLET ORAL at 21:16

## 2020-02-03 RX ADMIN — FUROSEMIDE 40 MG: 10 INJECTION, SOLUTION INTRAMUSCULAR; INTRAVENOUS at 14:50

## 2020-02-03 RX ADMIN — FERROUS SULFATE TAB 325 MG (65 MG ELEMENTAL FE) 325 MG: 325 (65 FE) TAB at 12:11

## 2020-02-03 RX ADMIN — ACETAMINOPHEN 650 MG: 325 TABLET ORAL at 06:09

## 2020-02-03 RX ADMIN — SODIUM CHLORIDE, PRESERVATIVE FREE 10 ML: 5 INJECTION INTRAVENOUS at 07:53

## 2020-02-03 RX ADMIN — AMPICILLIN SODIUM AND SULBACTAM SODIUM 1.5 G: 1; .5 INJECTION, POWDER, FOR SOLUTION INTRAMUSCULAR; INTRAVENOUS at 12:11

## 2020-02-03 RX ADMIN — IOPAMIDOL 100 ML: 755 INJECTION, SOLUTION INTRAVENOUS at 09:13

## 2020-02-03 RX ADMIN — FUROSEMIDE 40 MG: 10 INJECTION, SOLUTION INTRAMUSCULAR; INTRAVENOUS at 20:55

## 2020-02-03 RX ADMIN — AMPICILLIN SODIUM AND SULBACTAM SODIUM 1.5 G: 1; .5 INJECTION, POWDER, FOR SOLUTION INTRAMUSCULAR; INTRAVENOUS at 22:50

## 2020-02-03 RX ADMIN — FERROUS SULFATE TAB 325 MG (65 MG ELEMENTAL FE) 325 MG: 325 (65 FE) TAB at 07:52

## 2020-02-03 ASSESSMENT — PAIN SCALES - GENERAL
PAINLEVEL_OUTOF10: 0
PAINLEVEL_OUTOF10: 0
PAINLEVEL_OUTOF10: 3
PAINLEVEL_OUTOF10: 5
PAINLEVEL_OUTOF10: 0

## 2020-02-03 NOTE — CONSULTS
[facility]   Consult Note         Today's Date: 2/3/2020  Patient Name: Santiago Young  Date of admission: 2020  8:59 AM  Patient's age: 77 y.o., 1950  Admission Dx: UTI (urinary tract infection) [N39.0]  UTI (urinary tract infection) [N39.0]    Reason for Consult:  Cardiac evaluation    Requesting Physician: Jose Raza    REASON FOR CONSULT:  Hypoxia, Abn Echo    History Obtained From:  Patient, chart, staff, records    HISTORY OF PRESENT ILLNESS:      The patient is a 77 y.o. male who is admitted to the hospital for UTI. Echo today was abnormal and he has been hypoxic. Cardio called. From Chart:  The patient is a 77 y.o. male patient of Anabel LANG MD who presents with onset of fever--chills--painful urination---2020---worsened over the next 24 hours---today, 2020---fever to 103F----violent shaking chills-nausea--vomiting this AM--more of the dysuria----states that he has had recurrent and repeated events of UTIs for which he has been treated with Cipro and Macrobid-----the infections go away for up to several weeks and then recur--was to see Urology, but had to cancel due to his sister's death and ---will need Urology follow up     Was negative for Influenza A and B.  Ddd, however, have some respiratory signs and symptoms reported, although patient denies, such that other viral syndromes not excluded---no foreign travel or exposures known     Recent sessile colon polyp and seen by Dr. Rodriguez [facility] -----colonoscopy---2020 without incident and will need follow up colonoscopy     Tobacco abuse--- PPD---cessation encouraged--nicotine patch this hospitalization----prior Chantix with prior episodes of cessation     Past Medical History:   has a past medical history of Erectile dysfunction, Measles, Mumps, and UTI (urinary tract infection). Past Surgical History:   has a past surgical history that includes Exploration of undescended testicle ();  Colonoscopy 0.59* 0.55*   LABGLOM >60 >60   GLUCOSE 104* 114*     BNP: No results for input(s): BNP in the last 72 hours. PT/INR: No results for input(s): PROTIME, INR in the last 72 hours. APTT:No results for input(s): APTT in the last 72 hours. CARDIAC ENZYMES:No results for input(s): CKTOTAL, CKMB, CKMBINDEX, TROPONINI in the last 72 hours. FASTING LIPID PANEL:  Lab Results   Component Value Date    HDL 83 01/02/2020    TRIG 36 01/02/2020     LIVER PROFILE:No results for input(s): AST, ALT, LABALBU in the last 72 hours. Troponins: Invalid input(s): TROPONIN     Other Current Problems  Patient Active Problem List   Diagnosis    Tobacco use    Acute UTI    Erectile dysfunction    Anemia    Hiatal hernia    Polyp of ascending colon    Polyp of cecum    Polyp of transverse colon    Polyp of sigmoid colon    UTI (urinary tract infection)     Echo: 2/3/20  Left ventricle is mildly dilated. Left ventricular systolic function is low normal to normal.  Left ventricular ejection fraction is 50 to 55 %. Grade I (mild) left ventricular diastolic dysfunction. Left atrium is moderately dilated. Right atrial dilatation. Mitral annular calcification. Mild mitral regurgitation. Normal tricuspid valve leaflets. Mild tricuspid regurgitation. Estimated right ventricular systolic pressure is 44 mmHg. Mild pulmonary hypertension. Normal function of other valves. No pericardial effusion. IMPRESSION & Recommendations:      Hypoxia- add lasix 40 BID for 1-2 days  Echo reviewed, some diastolic dysfunction, normal EF  UTI- on Abx  Mild Sepsis- stable  Can follow up on discharge      Discussed with patient, family, and Nurse. Electronically signed by Oli Mary DO on 2/3/2020 at 2:51 PM    Oli Mary, [id number] [facility]  [facility]. JEDI MIND  [phone]

## 2020-02-03 NOTE — PLAN OF CARE
Problem: Sensory:  Goal: General experience of comfort will improve  Description  General experience of comfort will improve  Outcome: Ongoing     Problem: Urinary Elimination:  Goal: Signs and symptoms of infection will decrease  Description  Signs and symptoms of infection will decrease  Outcome: Ongoing  Goal: Ability to reestablish a normal urinary elimination pattern will improve - after catheter removal  Description  Ability to reestablish a normal urinary elimination pattern will improve  Outcome: Ongoing  Goal: Complications related to the disease process, condition or treatment will be avoided or minimized  Description  Complications related to the disease process, condition or treatment will be avoided or minimized  Outcome: Ongoing     Problem: Falls - Risk of:  Goal: Will remain free from falls  Description  Will remain free from falls  Outcome: Ongoing  Goal: Absence of physical injury  Description  Absence of physical injury  Outcome: Ongoing     Problem: Discharge Planning:  Goal: Discharged to appropriate level of care  Description  Discharged to appropriate level of care  2/3/2020 1241 by Miguel Simpson RN  Outcome: Ongoing  2/3/2020 0700 by Kecia Henriquez RN  Outcome: Ongoing

## 2020-02-03 NOTE — CONSULTS
Katelyn Prieto MD  Urology Consultation    Patient:  Nohemi Melo  MRN: [id number]  YOB: 1950    CHIEF COMPLAINT:  febrile UTI    HISTORY OF PRESENT ILLNESS:     The patient is a 77 y.o. male who presents with febrile UTI. Urology consulted for urinary retention    Onset was days ago  Overall, the problem(s) are worse. Severity is described as severe. Associated Symptoms: No dysuria, no gross hematuria. Current Pain Severity: 0    Distended bladder on CT scan  Hx of hypospadias and undescended testes repaired as a child  ++fevers. Hx of recurrent UTI  Severe LUTS at baseline  Hx of ED    Patient's old records reviewed. Pertinent patient notes and patient chart reviewed and summarized above. Past Medical History:    Past Medical History:   Diagnosis Date    Erectile dysfunction     Measles     Mumps     UTI (urinary tract infection)        Past Surgical History:    Past Surgical History:   Procedure Laterality Date    COLONOSCOPY  2012    COLONOSCOPY N/A 1/14/2020    COLONOSCOPY performed by Donavon Gonzales DO at [facility] GASTROINTESTINAL ENDOSCOPY N/A 1/14/2020    EGD BIOPSY performed by Donavon Gonzales DO at [facility] OR       Medications Prior to Admission:    Prior to Admission medications    Medication Sig Start Date End Date Taking? Authorizing Provider   sildenafil (VIAGRA) 50 MG tablet Take 1 tablet by mouth daily as needed for Erectile Dysfunction 1/2/20   Rashmi Barreto MD       Allergies:  Patient has no known allergies.     Social History:    Social History     Socioeconomic History    Marital status:      Spouse name: Not on file    Number of children: Not on file    Years of education: Not on file    Highest education level: Not on file   Occupational History    Not on file   Social Needs    Financial resource strain: Not on file    Food insecurity:     Worry: Not on file     Inability: Not on file   Razume (37.3 °C) Axillary 80 18 91 %   02/02/20 1611 135/66 98.9 °F (37.2 °C) Oral 78 18 94 %     Constitutional: Patient in no acute distress; Neuro: alert and oriented to person place and time. Psych: Mood and affect normal.  Skin: Normal  Lungs: Respiratory effort normal, CTA  Cardiovascular:  Normal peripheral pulses; Regular rate   Abdomen: Soft, non-tender, non-distended with no CVA, flank pain, hepatosplenomegaly or hernia. Kidneys normal.  Bladder non-tender and not distended. Lymphatics: no palpable lymphadenopathy  Minimal/no edema in bilateral lower extremities        LABS:   Recent Labs     02/01/20  0538  02/02/20  0556  02/02/20  1800 02/02/20  2340 02/03/20  0524   WBC 10.4  --  6.8  --   --   --  7.6   HGB 6.7*   < > 7.2*   < > 7.5* 7.5* 7.7*   HCT 22.2*   < > 23.6*   < > 24.6* 24.5* 25.7*   MCV 78.7*  --  79.2*  --   --   --  80.1*   *  --  84*  --   --   --  81*    < > = values in this interval not displayed. Recent Labs     02/01/20  0425 02/02/20  0556 02/03/20  0524    137 135   K 4.2 3.5* 3.6*    106 102   CO2 16* 19* 21   BUN 15 9 8   CREATININE 0.72 0.59* 0.55*     Lab Results   Component Value Date    PSA 0.05 01/02/2020    PSA 0.08 12/20/2019       Additional Lab/Culture results: none    Urinalysis: No results for input(s): COLORU, PHUR, LABCAST, WBCUA, RBCUA, MUCUS, TRICHOMONAS, YEAST, BACTERIA, CLARITYU, SPECGRAV, LEUKOCYTESUR, UROBILINOGEN, Cierra Freddy in the last 72 hours.     Invalid input(s): Sanya Amaroer     -----------------------------------------------------------------  Imaging Reviewed during this encounter:     Wilfredo Storey MD independently reviewed the images and verified the radiology reports from:      1.  Right lower lobe consolidative opacities, as seen on recent chest CT.       2.  No CT evidence for pyelonephritis.  Punctate right nephrolithiasis.       3.  Asymmetric masslike appearance of the left seminal vesicle, which may   represent underlying complex cyst, solid mass or glandular asymmetry.  If   urinary symptoms are present, consider follow-up imaging with   contrast-enhanced pelvic MRI.       4.  Distended bladder.  Small right posterolateral bladder diverticulum.             Assessment and Plan   Impression:    Patient Active Problem List   Diagnosis    Tobacco use    Acute UTI    Erectile dysfunction    Anemia    Hiatal hernia    Polyp of ascending colon    Polyp of cecum    Polyp of transverse colon    Polyp of sigmoid colon    UTI (urinary tract infection)    Hypospadias    Recurrent UTI       Plan:   Catheter today  Home with catheter  Broad spectrum oral flouroquinolone for 14 days as outpatient  Will need cystoscopy/prostate ultrasound as outpatient.      Katie Cruz MD  3:32 PM 2/3/2020

## 2020-02-03 NOTE — H&P
Cynthia Chiang MD  History and Physical    Patient:  Goyo Lipoma  MRN: [id number]  YOB: 1950    HISTORY OF PRESENT ILLNESS:     The patient is a 77 y.o. male who presents with urinary retention. Here for procedure. Patient's old records, notes and chart reviewed and summarized above. Cynthia Chiang MD independently reviewed the images and verified the radiology reports from:    Xr Chest Standard (2 Vw)    Result Date: 1/31/2020  EXAMINATION: TWO XRAY VIEWS OF THE CHEST 1/31/2020 9:38 am COMPARISON: Chest radiograph performed 05/21/2009. HISTORY: ORDERING SYSTEM PROVIDED HISTORY: cough fever TECHNOLOGIST PROVIDED HISTORY: cough fever Reason for Exam: Fever; fatigue; slight cough Acuity: Acute Type of Exam: Initial FINDINGS: There is no acute consolidation or effusion. There is no pneumothorax. The mediastinal structures are unremarkable. The upper abdomen is unremarkable. The extrathoracic soft tissues are unremarkable. There is no acute osseous abnormality. No acute cardiopulmonary process. Xr Chest Portable    Result Date: 2/1/2020  EXAMINATION: ONE XRAY VIEW OF THE CHEST 2/1/2020 3:55 am COMPARISON: None. HISTORY: ORDERING SYSTEM PROVIDED HISTORY: SOB acute TECHNOLOGIST PROVIDED HISTORY: SOB acute Reason for Exam: Acute shortness of breath Acuity: Acute FINDINGS: The cardiac silhouette appears within normal limits for size given portable technique. No convincing evidence of a focal consolidation. No pleural effusion or pneumothorax seen. Pulmonary vascular congestion. Pulmonary vascular congestion. Ct Chest Pulmonary Embolism W Contrast    Result Date: 2/1/2020  EXAMINATION: CTA OF THE CHEST 2/1/2020 7:58 am TECHNIQUE: CTA of the chest was performed after the administration of intravenous contrast.  Multiplanar reformatted images are provided for review. MIP images are provided for review.  Dose modulation, iterative reconstruction, and/or weight based adjustment of the mA/kV was utilized to reduce the radiation dose to as low as reasonably achievable. COMPARISON: None. HISTORY: ORDERING SYSTEM PROVIDED HISTORY: elevated Ddimer TECHNOLOGIST PROVIDED HISTORY: elevated Ddimer Reason for Exam:  Elevated D-dimer, sudden onset of shortness of breath last night. History of smoking 40 + years. Acuity: Acute Type of Exam: Initial FINDINGS: Thyroid gland appears normal.  Coronary artery calcification is seen. No intimal flap seen in aorta. No embolus is seen in the central, right, or left main pulmonary artery. No embolus is seen in the proximal segmental branches. Distal segmental branches and subsegmental branches are not well evaluated due to motion Patchy ground-glass opacities are seen in the right upper lobe. Patchy and more confluent opacities are seen in the right lower lobe. On the left patchy ground-glass and parenchymal opacities are seen in the left upper and left lower lobe. No significant pleural fluid Small hiatal hernia seen. There is nonspecific thickening at the GE junction. There is mild thickening of the adrenal glands. There is mild body wall anasarca. Degenerative changes are seen in the spine and shoulder joints. Limited by motion. No pulmonary embolus. Multifocal airspace disease, greatest in the right lower lobe, suspicious for pneumonia     Vl Dup Carotid Bilateral    Result Date: 1/31/2020  EXAMINATION: ULTRASOUND EVALUATION OF THE CAROTID ARTERIES 1/31/2020 COMPARISON: None HISTORY: ORDERING SYSTEM PROVIDED HISTORY: Bruit TECHNOLOGIST PROVIDED HISTORY: Bruit Reason for Exam: Bruit Acuity: Acute Type of Exam: Initial FINDINGS: RIGHT: The right common carotid artery demonstrates peak systolic velocities of 939 and 133 cm/sec in the proximal and distal segments respectively. The right internal carotid artery demonstrates the systolic velocities of 994, 135, and 104 cm/sec in the proximal, mid and distal segments respectively.   End-diastolic EXPLORATION OF UNDESCENDED TESTICLE  1979    UPPER GASTROINTESTINAL ENDOSCOPY N/A 1/14/2020    EGD BIOPSY performed by Thania Saravia DO at [address] [facility] [place]note 2[place] OR       Medications Prior to Admission:    Prior to Admission medications    Medication Sig Start Date End Date Taking? Authorizing Provider   sildenafil (VIAGRA) 50 MG tablet Take 1 tablet by mouth daily as needed for Erectile Dysfunction 1/2/20   Jade Castillo MD       Allergies:  Patient has no known allergies.     Social History:    Social History     Socioeconomic History    Marital status:      Spouse name: Not on file    Number of children: Not on file    Years of education: Not on file    Highest education level: Not on file   Occupational History    Not on file   Social Needs    Financial resource strain: Not on file    Food insecurity:     Worry: Not on file     Inability: Not on file    Transportation needs:     Medical: Not on file     Non-medical: Not on file   Tobacco Use    Smoking status: Current Every Day Smoker     Packs/day: 0.50     Types: Cigarettes    Smokeless tobacco: Never Used   Substance and Sexual Activity    Alcohol use: Yes     Comment: 1 beer a week    Drug use: Never    Sexual activity: Not on file   Lifestyle    Physical activity:     Days per week: Not on file     Minutes per session: Not on file    Stress: Not on file   Relationships    Social connections:     Talks on phone: Not on file     Gets together: Not on file     Attends Zoroastrianism service: Not on file     Active member of club or organization: Not on file     Attends meetings of clubs or organizations: Not on file     Relationship status: Not on file    Intimate partner violence:     Fear of current or ex partner: Not on file     Emotionally abused: Not on file     Physically abused: Not on file     Forced sexual activity: Not on file   Other Topics Concern    Not on file   Social History Narrative    Not on file       Family History:    Family History Problem Relation Age of Onset    Colon Cancer Mother     Hypertension Father     Early Death Father         before age 48       REVIEW OF SYSTEMS:  Constitutional: negative  Eyes: negative  Respiratory: negative  Cardiovascular: negative  Gastrointestinal: negative  Genitourinary: no acute issues  Musculoskeletal: negative  Skin: negative   Neurological: negative  Hematological/Lymphatic: negative  Psychological: negative    Physical Exam:      Patient Vitals for the past 24 hrs:   BP Temp Temp src Pulse Resp SpO2   02/03/20 1239 (!) 141/79 99.1 °F (37.3 °C) Oral 82 18 90 %   02/03/20 0817 -- 98.7 °F (37.1 °C) Oral -- -- --   02/03/20 0603 (!) 143/65 100.6 °F (38.1 °C) Oral 82 16 92 %   02/03/20 0324 (!) 141/70 100 °F (37.8 °C) Oral 82 16 91 %   02/02/20 2335 126/67 98.1 °F (36.7 °C) Oral 70 18 93 %   02/02/20 2128 -- -- -- -- 16 94 %   02/02/20 1902 (!) 142/71 99.1 °F (37.3 °C) Axillary 80 18 91 %   02/02/20 1611 135/66 98.9 °F (37.2 °C) Oral 78 18 94 %     Constitutional: Patient in no acute distress; Neuro: alert and oriented to person place and time. Psych: Mood and affect normal.  Skin: Normal  Lungs: Respiratory effort normal, CTA  Cardiovascular:  Normal peripheral pulses; no murmur. Normal rhythm  Abdomen: Soft, non-tender, non-distended with no CVA, flank pain, hepatosplenomegaly or hernia. Kidneys normal.  Bladder non-tender and not distended. LABS:   Recent Labs     02/01/20  0538  02/02/20  0556  02/02/20  1800 02/02/20  2340 02/03/20  0524   WBC 10.4  --  6.8  --   --   --  7.6   HGB 6.7*   < > 7.2*   < > 7.5* 7.5* 7.7*   HCT 22.2*   < > 23.6*   < > 24.6* 24.5* 25.7*   MCV 78.7*  --  79.2*  --   --   --  80.1*   *  --  84*  --   --   --  81*    < > = values in this interval not displayed.      Recent Labs     02/01/20  0425 02/02/20  0556 02/03/20  0524    137 135   K 4.2 3.5* 3.6*    106 102   CO2 16* 19* 21   BUN 15 9 8   CREATININE 0.72 0.59* 0.55*     Lab Results

## 2020-02-04 LAB
ABSOLUTE BANDS #: 1.57 K/UL
ABSOLUTE EOS #: 0.11 K/UL (ref 0–0.4)
ABSOLUTE IMMATURE GRANULOCYTE: 0 K/UL (ref 0–0.3)
ABSOLUTE LYMPH #: 2.24 K/UL (ref 1–4.8)
ABSOLUTE MONO #: 0.45 K/UL (ref 0.1–1.2)
ANION GAP SERPL CALCULATED.3IONS-SCNC: 14 MMOL/L (ref 9–17)
BANDS: 14 %
BASOPHILS # BLD: 1 % (ref 0–2)
BASOPHILS ABSOLUTE: 0.11 K/UL (ref 0–0.2)
BUN BLDV-MCNC: 11 MG/DL (ref 8–23)
BUN/CREAT BLD: 16 (ref 9–20)
CALCIUM SERPL-MCNC: 8.5 MG/DL (ref 8.6–10.4)
CHLORIDE BLD-SCNC: 94 MMOL/L (ref 98–107)
CO2: 25 MMOL/L (ref 20–31)
CREAT SERPL-MCNC: 0.69 MG/DL (ref 0.7–1.2)
DIFFERENTIAL TYPE: ABNORMAL
EOSINOPHILS RELATIVE PERCENT: 1 % (ref 1–8)
GFR AFRICAN AMERICAN: >60 ML/MIN
GFR NON-AFRICAN AMERICAN: >60 ML/MIN
GFR SERPL CREATININE-BSD FRML MDRD: ABNORMAL ML/MIN/{1.73_M2}
GFR SERPL CREATININE-BSD FRML MDRD: ABNORMAL ML/MIN/{1.73_M2}
GLUCOSE BLD-MCNC: 123 MG/DL (ref 70–99)
HCT VFR BLD CALC: 26.9 % (ref 40.7–50.3)
HEMOGLOBIN: 8.4 G/DL (ref 13–17)
IMMATURE GRANULOCYTES: 0 %
LYMPHOCYTES # BLD: 20 % (ref 15–43)
MAGNESIUM: 1.7 MG/DL (ref 1.6–2.6)
MCH RBC QN AUTO: 24.1 PG (ref 25.2–33.5)
MCHC RBC AUTO-ENTMCNC: 31.2 G/DL (ref 25.2–33.5)
MCV RBC AUTO: 77.3 FL (ref 82.6–102.9)
METAMYELOCYTES ABSOLUTE COUNT: 0.45 K/UL
METAMYELOCYTES: 4 %
MONOCYTES # BLD: 4 % (ref 6–14)
MORPHOLOGY: ABNORMAL
NRBC AUTOMATED: 0.4 PER 100 WBC
PDW BLD-RTO: 37.2 % (ref 11.8–14.4)
PLATELET # BLD: 73 K/UL (ref 138–453)
PLATELET ESTIMATE: ABNORMAL
PMV BLD AUTO: ABNORMAL FL (ref 8.1–13.5)
POTASSIUM SERPL-SCNC: 3 MMOL/L (ref 3.7–5.3)
POTASSIUM SERPL-SCNC: 4.5 MMOL/L (ref 3.7–5.3)
RBC # BLD: 3.48 M/UL (ref 4.21–5.77)
RBC # BLD: ABNORMAL 10*6/UL
SEG NEUTROPHILS: 56 % (ref 44–74)
SEGMENTED NEUTROPHILS ABSOLUTE COUNT: 6.27 K/UL (ref 1.8–7.7)
SODIUM BLD-SCNC: 133 MMOL/L (ref 135–144)
WBC # BLD: 11.2 K/UL (ref 3.5–11.3)
WBC # BLD: ABNORMAL 10*3/UL

## 2020-02-04 PROCEDURE — 2580000003 HC RX 258: Performed by: UROLOGY

## 2020-02-04 PROCEDURE — 6370000000 HC RX 637 (ALT 250 FOR IP): Performed by: UROLOGY

## 2020-02-04 PROCEDURE — 6360000002 HC RX W HCPCS: Performed by: NURSE PRACTITIONER

## 2020-02-04 PROCEDURE — 6360000002 HC RX W HCPCS: Performed by: INTERNAL MEDICINE

## 2020-02-04 PROCEDURE — 6370000000 HC RX 637 (ALT 250 FOR IP): Performed by: NURSE PRACTITIONER

## 2020-02-04 PROCEDURE — 1200000000 HC SEMI PRIVATE

## 2020-02-04 PROCEDURE — 99232 SBSQ HOSP IP/OBS MODERATE 35: CPT | Performed by: INTERNAL MEDICINE

## 2020-02-04 PROCEDURE — 6370000000 HC RX 637 (ALT 250 FOR IP): Performed by: INTERNAL MEDICINE

## 2020-02-04 PROCEDURE — 2580000003 HC RX 258: Performed by: INTERNAL MEDICINE

## 2020-02-04 PROCEDURE — 36415 COLL VENOUS BLD VENIPUNCTURE: CPT

## 2020-02-04 PROCEDURE — 80048 BASIC METABOLIC PNL TOTAL CA: CPT

## 2020-02-04 PROCEDURE — 84132 ASSAY OF SERUM POTASSIUM: CPT

## 2020-02-04 PROCEDURE — 6360000002 HC RX W HCPCS: Performed by: UROLOGY

## 2020-02-04 PROCEDURE — 94761 N-INVAS EAR/PLS OXIMETRY MLT: CPT

## 2020-02-04 PROCEDURE — 2700000000 HC OXYGEN THERAPY PER DAY

## 2020-02-04 PROCEDURE — 85025 COMPLETE CBC W/AUTO DIFF WBC: CPT

## 2020-02-04 PROCEDURE — 83735 ASSAY OF MAGNESIUM: CPT

## 2020-02-04 RX ORDER — ACETAMINOPHEN 500 MG
1000 TABLET ORAL EVERY 6 HOURS PRN
Status: DISCONTINUED | OUTPATIENT
Start: 2020-02-04 | End: 2020-02-07 | Stop reason: HOSPADM

## 2020-02-04 RX ORDER — BETHANECHOL CHLORIDE 25 MG/1
25 TABLET ORAL 4 TIMES DAILY
Status: DISCONTINUED | OUTPATIENT
Start: 2020-02-04 | End: 2020-02-07 | Stop reason: HOSPADM

## 2020-02-04 RX ORDER — LACTULOSE 10 G/15ML
20 SOLUTION ORAL 3 TIMES DAILY
Status: DISCONTINUED | OUTPATIENT
Start: 2020-02-04 | End: 2020-02-07 | Stop reason: HOSPADM

## 2020-02-04 RX ORDER — POTASSIUM CHLORIDE 20 MEQ/1
40 TABLET, EXTENDED RELEASE ORAL PRN
Status: DISCONTINUED | OUTPATIENT
Start: 2020-02-04 | End: 2020-02-04

## 2020-02-04 RX ORDER — POTASSIUM CHLORIDE 7.45 MG/ML
10 INJECTION INTRAVENOUS PRN
Status: DISCONTINUED | OUTPATIENT
Start: 2020-02-04 | End: 2020-02-04

## 2020-02-04 RX ORDER — FUROSEMIDE 10 MG/ML
40 INJECTION INTRAMUSCULAR; INTRAVENOUS 2 TIMES DAILY
Status: DISCONTINUED | OUTPATIENT
Start: 2020-02-04 | End: 2020-02-07 | Stop reason: HOSPADM

## 2020-02-04 RX ORDER — POTASSIUM CHLORIDE 20 MEQ/1
40 TABLET, EXTENDED RELEASE ORAL 2 TIMES DAILY WITH MEALS
Status: DISCONTINUED | OUTPATIENT
Start: 2020-02-04 | End: 2020-02-07 | Stop reason: HOSPADM

## 2020-02-04 RX ORDER — MAGNESIUM SULFATE 1 G/100ML
1 INJECTION INTRAVENOUS ONCE
Status: COMPLETED | OUTPATIENT
Start: 2020-02-04 | End: 2020-02-04

## 2020-02-04 RX ADMIN — FERROUS SULFATE TAB 325 MG (65 MG ELEMENTAL FE) 325 MG: 325 (65 FE) TAB at 08:06

## 2020-02-04 RX ADMIN — PHENAZOPYRIDINE HYDROCHLORIDE 200 MG: 100 TABLET ORAL at 08:05

## 2020-02-04 RX ADMIN — LACTULOSE 20 G: 20 SOLUTION ORAL at 21:31

## 2020-02-04 RX ADMIN — AMPICILLIN SODIUM AND SULBACTAM SODIUM 1.5 G: 1; .5 INJECTION, POWDER, FOR SOLUTION INTRAMUSCULAR; INTRAVENOUS at 18:03

## 2020-02-04 RX ADMIN — Medication 10 ML: at 10:45

## 2020-02-04 RX ADMIN — BETHANECHOL CHLORIDE 25 MG: 25 TABLET ORAL at 21:31

## 2020-02-04 RX ADMIN — AMPICILLIN SODIUM AND SULBACTAM SODIUM 1.5 G: 1; .5 INJECTION, POWDER, FOR SOLUTION INTRAMUSCULAR; INTRAVENOUS at 22:34

## 2020-02-04 RX ADMIN — ACETAMINOPHEN 1000 MG: 500 TABLET, FILM COATED ORAL at 18:08

## 2020-02-04 RX ADMIN — TAMSULOSIN HYDROCHLORIDE 0.4 MG: 0.4 CAPSULE ORAL at 08:06

## 2020-02-04 RX ADMIN — FUROSEMIDE 40 MG: 10 INJECTION, SOLUTION INTRAMUSCULAR; INTRAVENOUS at 17:51

## 2020-02-04 RX ADMIN — Medication 10 ML: at 22:34

## 2020-02-04 RX ADMIN — MAGNESIUM SULFATE HEPTAHYDRATE 1 G: 1 INJECTION, SOLUTION INTRAVENOUS at 14:48

## 2020-02-04 RX ADMIN — LACTULOSE 20 G: 20 SOLUTION ORAL at 12:53

## 2020-02-04 RX ADMIN — FERROUS SULFATE TAB 325 MG (65 MG ELEMENTAL FE) 325 MG: 325 (65 FE) TAB at 12:54

## 2020-02-04 RX ADMIN — AMPICILLIN SODIUM AND SULBACTAM SODIUM 1.5 G: 1; .5 INJECTION, POWDER, FOR SOLUTION INTRAMUSCULAR; INTRAVENOUS at 10:44

## 2020-02-04 RX ADMIN — VANCOMYCIN HYDROCHLORIDE 1750 MG: 1 INJECTION, POWDER, LYOPHILIZED, FOR SOLUTION INTRAVENOUS at 11:19

## 2020-02-04 RX ADMIN — FERROUS SULFATE TAB 325 MG (65 MG ELEMENTAL FE) 325 MG: 325 (65 FE) TAB at 17:51

## 2020-02-04 RX ADMIN — FUROSEMIDE 40 MG: 10 INJECTION, SOLUTION INTRAMUSCULAR; INTRAVENOUS at 08:05

## 2020-02-04 RX ADMIN — BETHANECHOL CHLORIDE 25 MG: 25 TABLET ORAL at 12:53

## 2020-02-04 RX ADMIN — Medication 10 ML: at 17:51

## 2020-02-04 RX ADMIN — POTASSIUM CHLORIDE 40 MEQ: 20 TABLET, EXTENDED RELEASE ORAL at 08:07

## 2020-02-04 RX ADMIN — POTASSIUM CHLORIDE 40 MEQ: 20 TABLET, EXTENDED RELEASE ORAL at 17:51

## 2020-02-04 RX ADMIN — VANCOMYCIN HYDROCHLORIDE 1750 MG: 1 INJECTION, POWDER, LYOPHILIZED, FOR SOLUTION INTRAVENOUS at 23:11

## 2020-02-04 RX ADMIN — PHENAZOPYRIDINE HYDROCHLORIDE 200 MG: 100 TABLET ORAL at 17:50

## 2020-02-04 RX ADMIN — SODIUM CHLORIDE, PRESERVATIVE FREE 10 ML: 5 INJECTION INTRAVENOUS at 21:31

## 2020-02-04 RX ADMIN — ACETAMINOPHEN 650 MG: 325 TABLET ORAL at 13:05

## 2020-02-04 RX ADMIN — ACETAMINOPHEN 650 MG: 325 TABLET ORAL at 08:04

## 2020-02-04 RX ADMIN — SODIUM CHLORIDE, PRESERVATIVE FREE 10 ML: 5 INJECTION INTRAVENOUS at 08:06

## 2020-02-04 RX ADMIN — BETHANECHOL CHLORIDE 25 MG: 25 TABLET ORAL at 17:51

## 2020-02-04 RX ADMIN — ACETAMINOPHEN 650 MG: 325 TABLET ORAL at 03:40

## 2020-02-04 RX ADMIN — AMPICILLIN SODIUM AND SULBACTAM SODIUM 1.5 G: 1; .5 INJECTION, POWDER, FOR SOLUTION INTRAMUSCULAR; INTRAVENOUS at 05:10

## 2020-02-04 RX ADMIN — POTASSIUM CHLORIDE 10 MEQ: 7.46 INJECTION, SOLUTION INTRAVENOUS at 07:00

## 2020-02-04 RX ADMIN — PHENAZOPYRIDINE HYDROCHLORIDE 200 MG: 100 TABLET ORAL at 12:54

## 2020-02-04 ASSESSMENT — PAIN SCALES - GENERAL
PAINLEVEL_OUTOF10: 0
PAINLEVEL_OUTOF10: 3
PAINLEVEL_OUTOF10: 0
PAINLEVEL_OUTOF10: 3
PAINLEVEL_OUTOF10: 0
PAINLEVEL_OUTOF10: 3
PAINLEVEL_OUTOF10: 0
PAINLEVEL_OUTOF10: 3

## 2020-02-04 ASSESSMENT — PAIN DESCRIPTION - LOCATION
LOCATION: BACK
LOCATION: BACK

## 2020-02-04 ASSESSMENT — PAIN DESCRIPTION - PAIN TYPE
TYPE: CHRONIC PAIN
TYPE: CHRONIC PAIN

## 2020-02-04 NOTE — PROGRESS NOTES
Pharmacy Note  Vancomycin Consult    Nohemi Melo is a 77 y.o. male ordered Vancomycin for positive blood culture; consult received from Dr. Annalise Farris to manage therapy. Also receiving Unasyn. Patient Active Problem List   Diagnosis    Tobacco use    Acute UTI    Erectile dysfunction    Anemia    Hiatal hernia    Polyp of ascending colon    Polyp of cecum    Polyp of transverse colon    Polyp of sigmoid colon    UTI (urinary tract infection)    Hypospadias    Recurrent UTI       Allergies:  Patient has no known allergies. Temp max: 102.7    Recent Labs     02/03/20  0524 02/04/20  0513   BUN 8 11       Recent Labs     02/03/20  0524 02/04/20  0513   CREATININE 0.55* 0.69*       Recent Labs     02/03/20  0524 02/04/20  0513   WBC 7.6 11.2         Intake/Output Summary (Last 24 hours) at 2/4/2020 1010  Last data filed at 2/4/2020 0542  Gross per 24 hour   Intake 850 ml   Output 5025 ml   Net -4175 ml       Culture Date      Source                       Results  01/31/20             Blood                       Gram (+) cocci in clusters x 2    Ht Readings from Last 1 Encounters:   01/31/20 6' (1.829 m)        Wt Readings from Last 1 Encounters:   02/04/20 222 lb 9.6 oz (101 kg)         Estimated Creatinine Clearance: 126 mL/min (A) (based on SCr of 0.69 mg/dL (L)). GOAL TROUGH: 15-20    Assessment/Plan:  Will initiate vancomycin 1750 mg IV every 12 hours. Timing of trough level will be determined based on culture results, renal function, and clinical response. Thank you for the consult. Will continue to follow.     [place], [place]  2/4/2020  10:15 AM

## 2020-02-04 NOTE — PROGRESS NOTES
Hospitalist Progress Note    Patient:  Panfilo Stewart     YOB: 1950    MRN: [id number]   Admit date: 1/31/2020     Acct: [de-identified]     PCP: Darrian LANG MD    CC--Interval History:  POD 1--cysto dilatation placement of a wayne catheter for urinary retention--will need to continue wayne cathter    Fever----concerning with the presence of a second blood culture positive---ECHO--2.3.2020 no vegetations---starting Vancomycin IV until ID-S determined    Anemia---iron deficiency---on iron replacement---hemoglobin up to 8.4    Fluid overload without failure----on Lasix IV     Sleep apnea--likely--[+] APNEA LINK----needs formal sleep study    UCx--no growth, but had been on antibiotics    K = 3.0 ----> potassium replacement    Magnesium = 1.7 ---> magnesium replacement    constipation ---> lactulose    Na = 133---salt drinks    See note below     All other ROS negative except noted in HPI    Diet:  DIET GENERAL;    Medications:  Scheduled Meds:   potassium chloride  40 mEq Oral BID WC    vancomycin  1,750 mg Intravenous Q12H    vancomycin (VANCOCIN) intermittent dosing (placeholder)   Other RX Placeholder    furosemide  40 mg Intravenous BID    ampicillin-sulbactam  1.5 g Intravenous Q6H    ferrous sulfate  325 mg Oral TID WC    phenazopyridine  200 mg Oral TID WC    sodium chloride flush  10 mL Intravenous 2 times per day    nicotine  1 patch Transdermal Daily    tamsulosin  0.4 mg Oral Daily     Continuous Infusions:  PRN Meds:traMADol, diphenhydrAMINE, albuterol, albuterol, sodium chloride nebulizer, sodium chloride flush, magnesium hydroxide, ondansetron, acetaminophen    Objective:  Labs:  CBC with Differential:    Lab Results   Component Value Date    WBC 11.2 02/04/2020    RBC 3.48 02/04/2020    HGB 8.4 02/04/2020    HCT 26.9 02/04/2020    PLT 73 02/04/2020    MCV 77.3 02/04/2020    MCH 24.1 02/04/2020    MCHC 31.2 02/04/2020    RDW 37.2 02/04/2020    NRBC 1 02/03/2020    METASPCT 4 02/04/2020 LYMPHOPCT 20 02/04/2020    MONOPCT 4 02/04/2020    MYELOPCT 13 01/02/2020    BASOPCT 1 02/04/2020    MONOSABS 0.45 02/04/2020    LYMPHSABS 2.24 02/04/2020    EOSABS 0.11 02/04/2020    BASOSABS 0.11 02/04/2020    DIFFTYPE NOT REPORTED 02/04/2020     BMP:    Lab Results   Component Value Date     02/04/2020    K 4.5 02/04/2020    CL 94 02/04/2020    CO2 25 02/04/2020    BUN 11 02/04/2020    LABALBU 4.6 01/02/2020    CREATININE 0.69 02/04/2020    CALCIUM 8.5 02/04/2020    GFRAA >60 02/04/2020    LABGLOM >60 02/04/2020    GLUCOSE 123 02/04/2020           Physical Exam:  Vitals: /63   Pulse 71   Temp 98.7 °F (37.1 °C) (Oral)   Resp 14   Ht 6' (1.829 m)   Wt 222 lb 9.6 oz (101 kg)   SpO2 91%   BMI 30.19 kg/m²   24 hour intake/output:    Intake/Output Summary (Last 24 hours) at 2/4/2020 1209  Last data filed at 2/4/2020 0542  Gross per 24 hour   Intake 850 ml   Output 5025 ml   Net -4175 ml     Last 3 weights: Wt Readings from Last 3 Encounters:   02/04/20 222 lb 9.6 oz (101 kg)   01/21/20 222 lb (100.7 kg)   01/14/20 222 lb 3.2 oz (100.8 kg)     HEENT: Normocephalic and Atraumatic  Neck: Supple, No Masses, Tenderness, Nodularity and No Lymphadenopathy  Chest/Lungs: generally clear, but crackles  Cardiac: Regular Rate and Rhythm without Rubs, Clicks, Gallops, or Murmurs  GI/Abdomen: Bowel Sounds Present and Soft, Non-tender, without Guarding or Rebound Tenderness  : wayne catheter----  EXT/Skin: No Edema, No Cyanosis and No Clubbing  Neuro: Alert and Oriented, to Person, to Time, to Place, to Situation and No Localizing Signs/Symptoms      Assessment:    Active Problems:    Acute UTI    UTI (urinary tract infection)    Hypospadias    Recurrent UTI  Resolved Problems:    * No resolved hospital problems.  *    MICHELLE,  [place] [facility] Ayad, IM; Tolu Koo, Urology]  FULL CODE    SCDs    no anticoagulation due to bleeding risk---anemia-polyps   SUPPLEMENTAL OXYGEN WAYNE--2.3.2020    Anti-infectives:  Rocephin IV--dcd, Unasyn IV---2. 1.2020, Vancomycin IV     POD _____ wayne catheter with cysto urethral dilatation---2. 3.2020---Marino---hypospadias  Aerococcus urinae UTI--likely--POA---1.31.2020--fever--shaking chills---recurrent-frequent UTIs           Sepsis ruled out---1.31.2020           UCx--1.31.2020--[-]--antibiotics   BLOOD CULTURE---[+]---2/3---GPC--clusters          CXR---2.3.2020---cardiomegaly--chronic appearing interstitial changes--RLL atelectasis           2D ECHO---2.3.2020---moderately dilated LA--mildly dilated LV--low normal-to-normal                             LVSF--RA dilatation--MAC--mild MR--RVSP ~ 44 mm Hg----mild pulmonary                             hypertension--LVEF ~ 50-55%--no vegetations  CARLIN--obstructive voiding signs--symptoms---dysuria            CT abdomen-pelvis---2. 3.2020---asymmetric mass--like appearance of left seminal                              vesicle possible complex cyst--solid mass--glandular asymmetry---extensive                              prostate calcifications--distended urinary bladder---small posterolateral                              bladder diverticulum  Viral respiratory syndrome---possible---1.31.2020--onset 48 hr PTA            CXR----2. 3.2020---chronic changes--RLL atelectasis             CXR---2.2.2020---clear            CTA chest---2. 1.2020---no PE--multifocal airspace disease--right > left            CXR---1.31.2020---NACs            CXR---2. 1.2020--pulmonary vascular congestion  Fluid overload---2. 1.2020--see above----hypoxia               EKG---2.3.2020---NSR--91---incomplete RBBB----RSR V1  Pulmonary hypertension---mild   Peripheral vascular disease             Left carotid bruit---1.31.2020             HZG-DZ---[id number]---7-87% TAMEKA-LICA--patent antegrade vertebrals---                             up to  35% plaque at bifurcations bilaterally  Sleep apnea?----APNEA LINK---2. 3.2020---2.4.2020  CKD---Stage

## 2020-02-04 NOTE — PLAN OF CARE
Problem: Sensory:  Goal: General experience of comfort will improve  Description  General experience of comfort will improve  2/3/2020 1947 by Jennifer Avendano RN  Outcome: Ongoing  2/3/2020 1241 by Jordy Siu RN  Outcome: Ongoing     Problem: Urinary Elimination:  Goal: Signs and symptoms of infection will decrease  Description  Signs and symptoms of infection will decrease  2/3/2020 1947 by Jennifer Avendano RN  Outcome: Ongoing  2/3/2020 1241 by Jordy Siu RN  Outcome: Ongoing  Goal: Ability to reestablish a normal urinary elimination pattern will improve - after catheter removal  Description  Ability to reestablish a normal urinary elimination pattern will improve  2/3/2020 1947 by Jennifer Avendano RN  Outcome: Ongoing  2/3/2020 1241 by Jordy Siu RN  Outcome: Ongoing  Goal: Complications related to the disease process, condition or treatment will be avoided or minimized  Description  Complications related to the disease process, condition or treatment will be avoided or minimized  2/3/2020 1947 by Jennifer Avendano RN  Outcome: Ongoing  2/3/2020 1241 by Jordy Siu RN  Outcome: Ongoing     Problem: Falls - Risk of:  Goal: Will remain free from falls  Description  Will remain free from falls  2/3/2020 1947 by Jennifer Avendano RN  Outcome: Ongoing  2/3/2020 1241 by Jordy Siu RN  Outcome: Ongoing  Goal: Absence of physical injury  Description  Absence of physical injury  2/3/2020 1947 by Jennifer Avendano RN  Outcome: Ongoing  2/3/2020 1241 by Jordy Siu RN  Outcome: Ongoing     Problem: Discharge Planning:  Goal: Discharged to appropriate level of care  Description  Discharged to appropriate level of care  2/3/2020 1947 by Jennifer Avendano RN  Outcome: Ongoing  2/3/2020 1241 by Jordy Siu RN  Outcome: Ongoing  2/3/2020 0700 by Mely Hayes RN  Outcome: Ongoing

## 2020-02-05 LAB
ABSOLUTE BANDS #: 2.24 K/UL
ABSOLUTE EOS #: 0.13 K/UL (ref 0–0.4)
ABSOLUTE IMMATURE GRANULOCYTE: 0 K/UL (ref 0–0.3)
ABSOLUTE LYMPH #: 2.24 K/UL (ref 1–4.8)
ABSOLUTE MONO #: 0.4 K/UL (ref 0.1–1.2)
ANION GAP SERPL CALCULATED.3IONS-SCNC: 12 MMOL/L (ref 9–17)
BANDS: 17 %
BASOPHILS # BLD: 0 % (ref 0–2)
BASOPHILS ABSOLUTE: 0 K/UL (ref 0–0.2)
BUN BLDV-MCNC: 12 MG/DL (ref 8–23)
BUN/CREAT BLD: 17 (ref 9–20)
CALCIUM SERPL-MCNC: 8.6 MG/DL (ref 8.6–10.4)
CHLORIDE BLD-SCNC: 97 MMOL/L (ref 98–107)
CO2: 26 MMOL/L (ref 20–31)
CREAT SERPL-MCNC: 0.69 MG/DL (ref 0.7–1.2)
CULTURE: ABNORMAL
DIFFERENTIAL TYPE: ABNORMAL
EOSINOPHILS RELATIVE PERCENT: 1 % (ref 1–8)
GFR AFRICAN AMERICAN: >60 ML/MIN
GFR NON-AFRICAN AMERICAN: >60 ML/MIN
GFR SERPL CREATININE-BSD FRML MDRD: ABNORMAL ML/MIN/{1.73_M2}
GFR SERPL CREATININE-BSD FRML MDRD: ABNORMAL ML/MIN/{1.73_M2}
GLUCOSE BLD-MCNC: 119 MG/DL (ref 70–99)
HCT VFR BLD CALC: 28.1 % (ref 40.7–50.3)
HEMOGLOBIN: 8.8 G/DL (ref 13–17)
IMMATURE GRANULOCYTES: 0 %
LYMPHOCYTES # BLD: 17 % (ref 15–43)
Lab: ABNORMAL
Lab: ABNORMAL
MCH RBC QN AUTO: 24.4 PG (ref 25.2–33.5)
MCHC RBC AUTO-ENTMCNC: 31.3 G/DL (ref 25.2–33.5)
MCV RBC AUTO: 77.8 FL (ref 82.6–102.9)
METAMYELOCYTES ABSOLUTE COUNT: 0.66 K/UL
METAMYELOCYTES: 5 %
MONOCYTES # BLD: 3 % (ref 6–14)
MORPHOLOGY: ABNORMAL
NRBC AUTOMATED: 0.5 PER 100 WBC
PDW BLD-RTO: 37.3 % (ref 11.8–14.4)
PLATELET # BLD: 72 K/UL (ref 138–453)
PLATELET ESTIMATE: ABNORMAL
PMV BLD AUTO: ABNORMAL FL (ref 8.1–13.5)
POTASSIUM SERPL-SCNC: 3.9 MMOL/L (ref 3.7–5.3)
RBC # BLD: 3.61 M/UL (ref 4.21–5.77)
RBC # BLD: ABNORMAL 10*6/UL
SEG NEUTROPHILS: 57 % (ref 44–74)
SEGMENTED NEUTROPHILS ABSOLUTE COUNT: 7.53 K/UL (ref 1.8–7.7)
SODIUM BLD-SCNC: 135 MMOL/L (ref 135–144)
SPECIMEN DESCRIPTION: ABNORMAL
SPECIMEN DESCRIPTION: ABNORMAL
WBC # BLD: 13.2 K/UL (ref 3.5–11.3)
WBC # BLD: ABNORMAL 10*3/UL

## 2020-02-05 PROCEDURE — 2580000003 HC RX 258: Performed by: INTERNAL MEDICINE

## 2020-02-05 PROCEDURE — 6370000000 HC RX 637 (ALT 250 FOR IP): Performed by: UROLOGY

## 2020-02-05 PROCEDURE — 1200000000 HC SEMI PRIVATE

## 2020-02-05 PROCEDURE — 85025 COMPLETE CBC W/AUTO DIFF WBC: CPT

## 2020-02-05 PROCEDURE — 80202 ASSAY OF VANCOMYCIN: CPT

## 2020-02-05 PROCEDURE — 6360000002 HC RX W HCPCS: Performed by: UROLOGY

## 2020-02-05 PROCEDURE — 6360000002 HC RX W HCPCS: Performed by: INTERNAL MEDICINE

## 2020-02-05 PROCEDURE — 36415 COLL VENOUS BLD VENIPUNCTURE: CPT

## 2020-02-05 PROCEDURE — 80048 BASIC METABOLIC PNL TOTAL CA: CPT

## 2020-02-05 PROCEDURE — 6370000000 HC RX 637 (ALT 250 FOR IP): Performed by: INTERNAL MEDICINE

## 2020-02-05 PROCEDURE — 2580000003 HC RX 258: Performed by: UROLOGY

## 2020-02-05 PROCEDURE — 99232 SBSQ HOSP IP/OBS MODERATE 35: CPT | Performed by: INTERNAL MEDICINE

## 2020-02-05 PROCEDURE — 6370000000 HC RX 637 (ALT 250 FOR IP): Performed by: NURSE PRACTITIONER

## 2020-02-05 RX ORDER — NITROFURANTOIN 25; 75 MG/1; MG/1
100 CAPSULE ORAL EVERY 12 HOURS SCHEDULED
Status: DISCONTINUED | OUTPATIENT
Start: 2020-02-05 | End: 2020-02-07 | Stop reason: HOSPADM

## 2020-02-05 RX ADMIN — ACETAMINOPHEN 1000 MG: 500 TABLET, FILM COATED ORAL at 08:17

## 2020-02-05 RX ADMIN — LACTULOSE 20 G: 20 SOLUTION ORAL at 08:19

## 2020-02-05 RX ADMIN — LACTULOSE 20 G: 20 SOLUTION ORAL at 15:38

## 2020-02-05 RX ADMIN — POTASSIUM CHLORIDE 40 MEQ: 20 TABLET, EXTENDED RELEASE ORAL at 08:28

## 2020-02-05 RX ADMIN — BETHANECHOL CHLORIDE 25 MG: 25 TABLET ORAL at 08:19

## 2020-02-05 RX ADMIN — FERROUS SULFATE TAB 325 MG (65 MG ELEMENTAL FE) 325 MG: 325 (65 FE) TAB at 12:10

## 2020-02-05 RX ADMIN — SODIUM CHLORIDE, PRESERVATIVE FREE 10 ML: 5 INJECTION INTRAVENOUS at 08:20

## 2020-02-05 RX ADMIN — BETHANECHOL CHLORIDE 25 MG: 25 TABLET ORAL at 21:08

## 2020-02-05 RX ADMIN — ONDANSETRON 8 MG: 2 INJECTION INTRAMUSCULAR; INTRAVENOUS at 10:23

## 2020-02-05 RX ADMIN — FUROSEMIDE 40 MG: 10 INJECTION, SOLUTION INTRAMUSCULAR; INTRAVENOUS at 09:17

## 2020-02-05 RX ADMIN — BETHANECHOL CHLORIDE 25 MG: 25 TABLET ORAL at 18:01

## 2020-02-05 RX ADMIN — ACETAMINOPHEN 1000 MG: 500 TABLET, FILM COATED ORAL at 15:40

## 2020-02-05 RX ADMIN — AMPICILLIN SODIUM AND SULBACTAM SODIUM 1.5 G: 1; .5 INJECTION, POWDER, FOR SOLUTION INTRAMUSCULAR; INTRAVENOUS at 23:06

## 2020-02-05 RX ADMIN — AMPICILLIN SODIUM AND SULBACTAM SODIUM 1.5 G: 1; .5 INJECTION, POWDER, FOR SOLUTION INTRAMUSCULAR; INTRAVENOUS at 05:05

## 2020-02-05 RX ADMIN — PHENAZOPYRIDINE HYDROCHLORIDE 200 MG: 100 TABLET ORAL at 08:20

## 2020-02-05 RX ADMIN — MAGNESIUM HYDROXIDE 30 ML: 400 SUSPENSION ORAL at 21:08

## 2020-02-05 RX ADMIN — NITROFURANTOIN MONOHYDRATE/MACROCRYSTALLINE 100 MG: 25; 75 CAPSULE ORAL at 21:08

## 2020-02-05 RX ADMIN — POTASSIUM CHLORIDE 40 MEQ: 20 TABLET, EXTENDED RELEASE ORAL at 18:00

## 2020-02-05 RX ADMIN — FERROUS SULFATE TAB 325 MG (65 MG ELEMENTAL FE) 325 MG: 325 (65 FE) TAB at 08:19

## 2020-02-05 RX ADMIN — TAMSULOSIN HYDROCHLORIDE 0.4 MG: 0.4 CAPSULE ORAL at 08:19

## 2020-02-05 RX ADMIN — PHENAZOPYRIDINE HYDROCHLORIDE 200 MG: 100 TABLET ORAL at 18:00

## 2020-02-05 RX ADMIN — FERROUS SULFATE TAB 325 MG (65 MG ELEMENTAL FE) 325 MG: 325 (65 FE) TAB at 18:00

## 2020-02-05 RX ADMIN — BETHANECHOL CHLORIDE 25 MG: 25 TABLET ORAL at 12:10

## 2020-02-05 RX ADMIN — NITROFURANTOIN MONOHYDRATE/MACROCRYSTALLINE 100 MG: 25; 75 CAPSULE ORAL at 10:22

## 2020-02-05 RX ADMIN — ACETAMINOPHEN 1000 MG: 500 TABLET, FILM COATED ORAL at 23:12

## 2020-02-05 RX ADMIN — AMPICILLIN SODIUM AND SULBACTAM SODIUM 1.5 G: 1; .5 INJECTION, POWDER, FOR SOLUTION INTRAMUSCULAR; INTRAVENOUS at 12:11

## 2020-02-05 RX ADMIN — TRAMADOL HYDROCHLORIDE 50 MG: 50 TABLET, FILM COATED ORAL at 10:22

## 2020-02-05 RX ADMIN — LACTULOSE 20 G: 20 SOLUTION ORAL at 21:08

## 2020-02-05 RX ADMIN — AMPICILLIN SODIUM AND SULBACTAM SODIUM 1.5 G: 1; .5 INJECTION, POWDER, FOR SOLUTION INTRAMUSCULAR; INTRAVENOUS at 18:01

## 2020-02-05 RX ADMIN — PHENAZOPYRIDINE HYDROCHLORIDE 200 MG: 100 TABLET ORAL at 13:19

## 2020-02-05 RX ADMIN — Medication 10 ML: at 23:06

## 2020-02-05 RX ADMIN — VANCOMYCIN HYDROCHLORIDE 1750 MG: 1 INJECTION, POWDER, LYOPHILIZED, FOR SOLUTION INTRAVENOUS at 13:11

## 2020-02-05 RX ADMIN — FUROSEMIDE 40 MG: 10 INJECTION, SOLUTION INTRAMUSCULAR; INTRAVENOUS at 18:01

## 2020-02-05 RX ADMIN — Medication 10 ML: at 10:24

## 2020-02-05 RX ADMIN — SODIUM CHLORIDE, PRESERVATIVE FREE 10 ML: 5 INJECTION INTRAVENOUS at 21:00

## 2020-02-05 ASSESSMENT — PAIN DESCRIPTION - LOCATION: LOCATION: BACK

## 2020-02-05 ASSESSMENT — PAIN SCALES - GENERAL
PAINLEVEL_OUTOF10: 3
PAINLEVEL_OUTOF10: 1
PAINLEVEL_OUTOF10: 0
PAINLEVEL_OUTOF10: 4
PAINLEVEL_OUTOF10: 8
PAINLEVEL_OUTOF10: 2
PAINLEVEL_OUTOF10: 3
PAINLEVEL_OUTOF10: 1
PAINLEVEL_OUTOF10: 3
PAINLEVEL_OUTOF10: 0
PAINLEVEL_OUTOF10: 2
PAINLEVEL_OUTOF10: 4
PAINLEVEL_OUTOF10: 4
PAINLEVEL_OUTOF10: 3

## 2020-02-05 ASSESSMENT — PAIN DESCRIPTION - PAIN TYPE: TYPE: CHRONIC PAIN

## 2020-02-05 NOTE — FLOWSHEET NOTE
Visited patient and wife to follow up on Advance Directives documents given two days ago. Wife stated that she completed hers; patient stated that he plans to complete his soon. Patient and wife stated that they had no questions at this time and required to assistance to complete the documents. Plan: Chaplains remain available for spiritual and emotional support.        02/05/20 1034   Encounter Summary   Services provided to: Patient and family together   Referral/Consult From: Physician   Support System Family members   Continue Visiting   (2/5/20)   Complexity of Encounter Low   Length of Encounter 15 minutes   Advance Care Planning Yes   Electronically signed by Mac Lucas on 2/5/2020 at 10:39 AM

## 2020-02-05 NOTE — PROGRESS NOTES
Pharmacy Vancomycin Consult    Vancomycin Day: 2  Current Dosin mg q12h    Temp max:  101.1    Recent Labs     20  0513 20  0543   CREATININE 0.69* 0.69*     Estimated Creatinine Clearance: 126 mL/min (A) (based on SCr of 0.69 mg/dL (L)). Recent Labs     20  0513 20  0543   WBC 11.2 13.2*       Culture Date       Source                   Results  20              Blood              AEROCOCCUS URINAE     Level ordered for: 20 @ 2230    Assessment/Plan:  Scr stable/declining. WBC increased from yesterday. Febrile. UO was good yesterday. Physician will add Macrobid to antibiotics. Patient is currently on recommended regimen for Aerococcus Urinae.     Brianna Cheung  2020  10:55 AM

## 2020-02-05 NOTE — PROGRESS NOTES
Hospitalist Progress Note    Patient:  Omaira Donahue     YOB: 1950    MRN: [id number]   Admit date: 1/31/2020     Acct: [de-identified]     PCP: Lata LANG MD    CC--Interval History:   POD 2---cysto-assisted wayne catheter placement---urine clear--orange [pyridium]    Aerococcus urinae blood culture--[+]---2/3 and urine [no growth likely result of antibiotics----still fever---Unasyn--Vancomycin----added  Macrobid--but for last evening, slight downward trend in temp. Anemia---iron deficiency--on iron replacement----PRBC transfusion 2. 1.2020---hemoglobin now up to 8.8    Fluid overload without failure---cont'd IV antibiotics     Thrombocytopenia---stabilizing    Constipation---lactulose     See note below     All other ROS negative except noted in HPI    Diet:  DIET GENERAL;    Medications:  Scheduled Meds:   nitrofurantoin (macrocrystal-monohydrate)  100 mg Oral 2 times per day    potassium chloride  40 mEq Oral BID WC    vancomycin  1,750 mg Intravenous Q12H    vancomycin (VANCOCIN) intermittent dosing (placeholder)   Other RX Placeholder    bethanechol  25 mg Oral 4x Daily    lactulose  20 g Oral TID    furosemide  40 mg Intravenous BID    ampicillin-sulbactam  1.5 g Intravenous Q6H    ferrous sulfate  325 mg Oral TID WC    phenazopyridine  200 mg Oral TID WC    sodium chloride flush  10 mL Intravenous 2 times per day    nicotine  1 patch Transdermal Daily    tamsulosin  0.4 mg Oral Daily     Continuous Infusions:  PRN Meds:acetaminophen, traMADol, diphenhydrAMINE, albuterol, albuterol, sodium chloride nebulizer, sodium chloride flush, magnesium hydroxide, ondansetron    Objective:  Labs:  CBC with Differential:    Lab Results   Component Value Date    WBC 13.2 02/05/2020    RBC 3.61 02/05/2020    HGB 8.8 02/05/2020    HCT 28.1 02/05/2020    PLT 72 02/05/2020    MCV 77.8 02/05/2020    MCH 24.4 02/05/2020    MCHC 31.3 02/05/2020    RDW 37.3 02/05/2020    NRBC 1 02/03/2020    METASPCT 5 02/05/2020    LYMPHOPCT 17 02/05/2020    MONOPCT 3 02/05/2020    MYELOPCT 13 01/02/2020    BASOPCT 0 02/05/2020    MONOSABS 0.40 02/05/2020    LYMPHSABS 2.24 02/05/2020    EOSABS 0.13 02/05/2020    BASOSABS 0.00 02/05/2020    DIFFTYPE NOT REPORTED 02/05/2020     BMP:    Lab Results   Component Value Date     02/05/2020    K 3.9 02/05/2020    CL 97 02/05/2020    CO2 26 02/05/2020    BUN 12 02/05/2020    LABALBU 4.6 01/02/2020    CREATININE 0.69 02/05/2020    CALCIUM 8.6 02/05/2020    GFRAA >60 02/05/2020    LABGLOM >60 02/05/2020    GLUCOSE 119 02/05/2020           Physical Exam:  Vitals: BP (!) 109/57 Comment: notified nurse  Pulse 69   Temp 98.1 °F (36.7 °C) (Tympanic)   Resp 21   Ht 6' (1.829 m)   Wt 222 lb 9.6 oz (101 kg)   SpO2 91%   BMI 30.19 kg/m²   24 hour intake/output:    Intake/Output Summary (Last 24 hours) at 2/5/2020 1259  Last data filed at 2/5/2020 0515  Gross per 24 hour   Intake 1240 ml   Output 2550 ml   Net -1310 ml     Last 3 weights: Wt Readings from Last 3 Encounters:   02/04/20 222 lb 9.6 oz (101 kg)   01/21/20 222 lb (100.7 kg)   01/14/20 222 lb 3.2 oz (100.8 kg)     HEENT: Normocephalic and Atraumatic  Neck: Supple, No Masses, Tenderness, Nodularity and No Lymphadenopathy  Chest/Lungs: Clear to Auscultation without Rales, Rhonchi, or Wheezes  Cardiac: Regular Rate and Rhythm without Rubs, Clicks, Gallops, or Murmurs  GI/Abdomen: Bowel Sounds Present and Soft, Non-tender, without Guarding or Rebound Tenderness  : wayne catheter--urine clear--pyridium orange  EXT/Skin: No Edema, No Cyanosis and No Clubbing  Neuro: Alert and Oriented, to Person, to Time, to Place, to Situation and No Localizing Signs/Symptoms      Assessment:    Active Problems:    Acute UTI    UTI (urinary tract infection)    Hypospadias    Recurrent UTI  Resolved Problems:    * No resolved hospital problems.  *    Mynor MARTINEZ IM; Sonali Lynch, Urology]  FULL CODE    SCDs    no

## 2020-02-06 LAB
ABSOLUTE EOS #: 0 K/UL (ref 0–0.4)
ABSOLUTE IMMATURE GRANULOCYTE: 2.37 K/UL (ref 0–0.3)
ABSOLUTE LYMPH #: 1.63 K/UL (ref 1–4.8)
ABSOLUTE MONO #: 0.59 K/UL (ref 0.1–1.2)
ANION GAP SERPL CALCULATED.3IONS-SCNC: 12 MMOL/L (ref 9–17)
BASOPHILS # BLD: 0 % (ref 0–2)
BASOPHILS ABSOLUTE: 0 K/UL (ref 0–0.2)
BUN BLDV-MCNC: 15 MG/DL (ref 8–23)
BUN/CREAT BLD: 23 (ref 9–20)
CALCIUM SERPL-MCNC: 8.8 MG/DL (ref 8.6–10.4)
CHLORIDE BLD-SCNC: 96 MMOL/L (ref 98–107)
CO2: 26 MMOL/L (ref 20–31)
CREAT SERPL-MCNC: 0.66 MG/DL (ref 0.7–1.2)
CULTURE: NORMAL
DIFFERENTIAL TYPE: ABNORMAL
EOSINOPHILS RELATIVE PERCENT: 0 % (ref 1–8)
GFR AFRICAN AMERICAN: >60 ML/MIN
GFR NON-AFRICAN AMERICAN: >60 ML/MIN
GFR SERPL CREATININE-BSD FRML MDRD: ABNORMAL ML/MIN/{1.73_M2}
GFR SERPL CREATININE-BSD FRML MDRD: ABNORMAL ML/MIN/{1.73_M2}
GLUCOSE BLD-MCNC: 117 MG/DL (ref 70–99)
HCT VFR BLD CALC: 27.8 % (ref 40.7–50.3)
HEMOGLOBIN: 8.5 G/DL (ref 13–17)
IMMATURE GRANULOCYTES: 16 %
LYMPHOCYTES # BLD: 11 % (ref 15–43)
Lab: NORMAL
MCH RBC QN AUTO: 24 PG (ref 25.2–33.5)
MCHC RBC AUTO-ENTMCNC: 30.6 G/DL (ref 25.2–33.5)
MCV RBC AUTO: 78.5 FL (ref 82.6–102.9)
MONOCYTES # BLD: 4 % (ref 6–14)
MORPHOLOGY: ABNORMAL
MORPHOLOGY: ABNORMAL
NRBC AUTOMATED: 0.3 PER 100 WBC
PDW BLD-RTO: 36.9 % (ref 11.8–14.4)
PLATELET # BLD: 70 K/UL (ref 138–453)
PLATELET ESTIMATE: ABNORMAL
PMV BLD AUTO: ABNORMAL FL (ref 8.1–13.5)
POTASSIUM SERPL-SCNC: 4.3 MMOL/L (ref 3.7–5.3)
RBC # BLD: 3.54 M/UL (ref 4.21–5.77)
RBC # BLD: ABNORMAL 10*6/UL
SEG NEUTROPHILS: 69 % (ref 44–74)
SEGMENTED NEUTROPHILS ABSOLUTE COUNT: 10.21 K/UL (ref 1.8–7.7)
SODIUM BLD-SCNC: 134 MMOL/L (ref 135–144)
SPECIMEN DESCRIPTION: NORMAL
VANCOMYCIN TROUGH DATE LAST DOSE: NORMAL
VANCOMYCIN TROUGH DOSE AMOUNT: NORMAL
VANCOMYCIN TROUGH TIME LAST DOSE: NORMAL
VANCOMYCIN TROUGH: 14 UG/ML (ref 10–20)
WBC # BLD: 14.8 K/UL (ref 3.5–11.3)
WBC # BLD: ABNORMAL 10*3/UL

## 2020-02-06 PROCEDURE — 97165 OT EVAL LOW COMPLEX 30 MIN: CPT | Performed by: OCCUPATIONAL THERAPIST

## 2020-02-06 PROCEDURE — 1200000000 HC SEMI PRIVATE

## 2020-02-06 PROCEDURE — 6370000000 HC RX 637 (ALT 250 FOR IP): Performed by: NURSE PRACTITIONER

## 2020-02-06 PROCEDURE — 97116 GAIT TRAINING THERAPY: CPT | Performed by: PHYSICAL THERAPIST

## 2020-02-06 PROCEDURE — 6360000002 HC RX W HCPCS: Performed by: INTERNAL MEDICINE

## 2020-02-06 PROCEDURE — 36415 COLL VENOUS BLD VENIPUNCTURE: CPT

## 2020-02-06 PROCEDURE — 85025 COMPLETE CBC W/AUTO DIFF WBC: CPT

## 2020-02-06 PROCEDURE — 6370000000 HC RX 637 (ALT 250 FOR IP): Performed by: UROLOGY

## 2020-02-06 PROCEDURE — 2580000003 HC RX 258: Performed by: UROLOGY

## 2020-02-06 PROCEDURE — 80048 BASIC METABOLIC PNL TOTAL CA: CPT

## 2020-02-06 PROCEDURE — 2580000003 HC RX 258: Performed by: INTERNAL MEDICINE

## 2020-02-06 PROCEDURE — 99232 SBSQ HOSP IP/OBS MODERATE 35: CPT | Performed by: INTERNAL MEDICINE

## 2020-02-06 PROCEDURE — 6360000002 HC RX W HCPCS: Performed by: UROLOGY

## 2020-02-06 PROCEDURE — 97161 PT EVAL LOW COMPLEX 20 MIN: CPT | Performed by: PHYSICAL THERAPIST

## 2020-02-06 PROCEDURE — 6370000000 HC RX 637 (ALT 250 FOR IP): Performed by: INTERNAL MEDICINE

## 2020-02-06 PROCEDURE — 94761 N-INVAS EAR/PLS OXIMETRY MLT: CPT

## 2020-02-06 RX ORDER — BISACODYL 10 MG
20 SUPPOSITORY, RECTAL RECTAL ONCE
Status: DISCONTINUED | OUTPATIENT
Start: 2020-02-06 | End: 2020-02-07 | Stop reason: HOSPADM

## 2020-02-06 RX ADMIN — TRAMADOL HYDROCHLORIDE 50 MG: 50 TABLET, FILM COATED ORAL at 11:29

## 2020-02-06 RX ADMIN — FERROUS SULFATE TAB 325 MG (65 MG ELEMENTAL FE) 325 MG: 325 (65 FE) TAB at 11:29

## 2020-02-06 RX ADMIN — Medication 10 ML: at 05:29

## 2020-02-06 RX ADMIN — AMPICILLIN SODIUM AND SULBACTAM SODIUM 1.5 G: 1; .5 INJECTION, POWDER, FOR SOLUTION INTRAMUSCULAR; INTRAVENOUS at 05:28

## 2020-02-06 RX ADMIN — BETHANECHOL CHLORIDE 25 MG: 25 TABLET ORAL at 16:47

## 2020-02-06 RX ADMIN — PHENAZOPYRIDINE HYDROCHLORIDE 200 MG: 100 TABLET ORAL at 16:47

## 2020-02-06 RX ADMIN — FUROSEMIDE 40 MG: 10 INJECTION, SOLUTION INTRAMUSCULAR; INTRAVENOUS at 08:05

## 2020-02-06 RX ADMIN — BETHANECHOL CHLORIDE 25 MG: 25 TABLET ORAL at 12:08

## 2020-02-06 RX ADMIN — PHENAZOPYRIDINE HYDROCHLORIDE 200 MG: 100 TABLET ORAL at 11:28

## 2020-02-06 RX ADMIN — BETHANECHOL CHLORIDE 25 MG: 25 TABLET ORAL at 08:05

## 2020-02-06 RX ADMIN — FUROSEMIDE 40 MG: 10 INJECTION, SOLUTION INTRAMUSCULAR; INTRAVENOUS at 16:46

## 2020-02-06 RX ADMIN — VANCOMYCIN HYDROCHLORIDE 1750 MG: 1 INJECTION, POWDER, LYOPHILIZED, FOR SOLUTION INTRAVENOUS at 00:50

## 2020-02-06 RX ADMIN — HYDROMORPHONE HYDROCHLORIDE 0.5 MG: 1 INJECTION, SOLUTION INTRAMUSCULAR; INTRAVENOUS; SUBCUTANEOUS at 16:47

## 2020-02-06 RX ADMIN — TRAMADOL HYDROCHLORIDE 50 MG: 50 TABLET, FILM COATED ORAL at 20:19

## 2020-02-06 RX ADMIN — ACETAMINOPHEN 1000 MG: 500 TABLET, FILM COATED ORAL at 10:55

## 2020-02-06 RX ADMIN — Medication 10 ML: at 16:47

## 2020-02-06 RX ADMIN — AMPICILLIN SODIUM AND SULBACTAM SODIUM 1.5 G: 1; .5 INJECTION, POWDER, FOR SOLUTION INTRAMUSCULAR; INTRAVENOUS at 16:46

## 2020-02-06 RX ADMIN — AMPICILLIN SODIUM AND SULBACTAM SODIUM 1.5 G: 1; .5 INJECTION, POWDER, FOR SOLUTION INTRAMUSCULAR; INTRAVENOUS at 10:39

## 2020-02-06 RX ADMIN — TAMSULOSIN HYDROCHLORIDE 0.4 MG: 0.4 CAPSULE ORAL at 08:05

## 2020-02-06 RX ADMIN — ACETAMINOPHEN 1000 MG: 500 TABLET, FILM COATED ORAL at 23:47

## 2020-02-06 RX ADMIN — AMPICILLIN SODIUM AND SULBACTAM SODIUM 1.5 G: 1; .5 INJECTION, POWDER, FOR SOLUTION INTRAMUSCULAR; INTRAVENOUS at 23:18

## 2020-02-06 RX ADMIN — SODIUM CHLORIDE, PRESERVATIVE FREE 10 ML: 5 INJECTION INTRAVENOUS at 08:06

## 2020-02-06 RX ADMIN — FERROUS SULFATE TAB 325 MG (65 MG ELEMENTAL FE) 325 MG: 325 (65 FE) TAB at 16:47

## 2020-02-06 RX ADMIN — VANCOMYCIN HYDROCHLORIDE 1750 MG: 1 INJECTION, POWDER, LYOPHILIZED, FOR SOLUTION INTRAVENOUS at 23:47

## 2020-02-06 RX ADMIN — BETHANECHOL CHLORIDE 25 MG: 25 TABLET ORAL at 20:19

## 2020-02-06 RX ADMIN — POTASSIUM CHLORIDE 40 MEQ: 20 TABLET, EXTENDED RELEASE ORAL at 16:47

## 2020-02-06 RX ADMIN — VANCOMYCIN HYDROCHLORIDE 1750 MG: 1 INJECTION, POWDER, LYOPHILIZED, FOR SOLUTION INTRAVENOUS at 12:08

## 2020-02-06 RX ADMIN — LACTULOSE 20 G: 20 SOLUTION ORAL at 20:19

## 2020-02-06 RX ADMIN — LACTULOSE 20 G: 20 SOLUTION ORAL at 08:05

## 2020-02-06 RX ADMIN — FERROUS SULFATE TAB 325 MG (65 MG ELEMENTAL FE) 325 MG: 325 (65 FE) TAB at 08:05

## 2020-02-06 RX ADMIN — LACTULOSE 20 G: 20 SOLUTION ORAL at 15:05

## 2020-02-06 RX ADMIN — POTASSIUM CHLORIDE 40 MEQ: 20 TABLET, EXTENDED RELEASE ORAL at 08:06

## 2020-02-06 RX ADMIN — NITROFURANTOIN MONOHYDRATE/MACROCRYSTALLINE 100 MG: 25; 75 CAPSULE ORAL at 20:18

## 2020-02-06 RX ADMIN — PHENAZOPYRIDINE HYDROCHLORIDE 200 MG: 100 TABLET ORAL at 08:05

## 2020-02-06 RX ADMIN — NITROFURANTOIN MONOHYDRATE/MACROCRYSTALLINE 100 MG: 25; 75 CAPSULE ORAL at 08:05

## 2020-02-06 ASSESSMENT — PAIN DESCRIPTION - DIRECTION
RADIATING_TOWARDS: HIP

## 2020-02-06 ASSESSMENT — PAIN DESCRIPTION - PROGRESSION
CLINICAL_PROGRESSION: GRADUALLY WORSENING
CLINICAL_PROGRESSION: NOT CHANGED
CLINICAL_PROGRESSION: GRADUALLY WORSENING
CLINICAL_PROGRESSION: NOT CHANGED
CLINICAL_PROGRESSION: NOT CHANGED
CLINICAL_PROGRESSION: RAPIDLY WORSENING
CLINICAL_PROGRESSION: GRADUALLY WORSENING
CLINICAL_PROGRESSION: RAPIDLY WORSENING
CLINICAL_PROGRESSION: GRADUALLY WORSENING

## 2020-02-06 ASSESSMENT — PAIN DESCRIPTION - LOCATION
LOCATION: BACK
LOCATION: BACK
LOCATION: BACK;BUTTOCKS
LOCATION: BACK

## 2020-02-06 ASSESSMENT — PAIN DESCRIPTION - ONSET
ONSET: ON-GOING
ONSET: PROGRESSIVE
ONSET: ON-GOING

## 2020-02-06 ASSESSMENT — PAIN DESCRIPTION - PAIN TYPE
TYPE: CHRONIC PAIN
TYPE: ACUTE PAIN
TYPE: CHRONIC PAIN
TYPE: CHRONIC PAIN

## 2020-02-06 ASSESSMENT — PAIN - FUNCTIONAL ASSESSMENT
PAIN_FUNCTIONAL_ASSESSMENT: ACTIVITIES ARE NOT PREVENTED
PAIN_FUNCTIONAL_ASSESSMENT: PREVENTS OR INTERFERES SOME ACTIVE ACTIVITIES AND ADLS
PAIN_FUNCTIONAL_ASSESSMENT: ACTIVITIES ARE NOT PREVENTED
PAIN_FUNCTIONAL_ASSESSMENT: ACTIVITIES ARE NOT PREVENTED
PAIN_FUNCTIONAL_ASSESSMENT: PREVENTS OR INTERFERES SOME ACTIVE ACTIVITIES AND ADLS

## 2020-02-06 ASSESSMENT — PAIN DESCRIPTION - ORIENTATION
ORIENTATION: RIGHT;LEFT
ORIENTATION: RIGHT
ORIENTATION: LOWER
ORIENTATION: RIGHT;LOWER
ORIENTATION: LOWER
ORIENTATION: RIGHT
ORIENTATION: LOWER

## 2020-02-06 ASSESSMENT — PAIN DESCRIPTION - FREQUENCY
FREQUENCY: CONTINUOUS
FREQUENCY: INTERMITTENT
FREQUENCY: CONTINUOUS
FREQUENCY: INTERMITTENT

## 2020-02-06 ASSESSMENT — PAIN SCALES - GENERAL
PAINLEVEL_OUTOF10: 0
PAINLEVEL_OUTOF10: 6
PAINLEVEL_OUTOF10: 9
PAINLEVEL_OUTOF10: 10
PAINLEVEL_OUTOF10: 3
PAINLEVEL_OUTOF10: 10
PAINLEVEL_OUTOF10: 0
PAINLEVEL_OUTOF10: 6
PAINLEVEL_OUTOF10: 6
PAINLEVEL_OUTOF10: 0
PAINLEVEL_OUTOF10: 3
PAINLEVEL_OUTOF10: 6

## 2020-02-06 ASSESSMENT — PAIN DESCRIPTION - DESCRIPTORS
DESCRIPTORS: ACHING

## 2020-02-06 ASSESSMENT — PAIN SCALES - WONG BAKER: WONGBAKER_NUMERICALRESPONSE: 6

## 2020-02-06 NOTE — PROGRESS NOTES
Orientation  Orientation  Overall Orientation Status: Within Normal Limits  Social/Functional History  Social/Functional History  Lives With: Spouse  Type of Home: House  Home Layout: One level  Home Access: Stairs to enter without rails  Entrance Stairs - Number of Steps: 2  Bathroom Shower/Tub: Walk-in shower  Bathroom Toilet: Handicap height  Bathroom Equipment: Grab bars in shower, Built-in shower seat  Bathroom Accessibility: Accessible  ADL Assistance: Independent  Homemaking Assistance: Independent  Homemaking Responsibilities: Yes  Meal Prep Responsibility: Primary  Laundry Responsibility: No  Cleaning Responsibility: No  Shopping Responsibility: Primary  Ambulation Assistance: Independent  Transfer Assistance: Independent  Active : Yes  Mode of Transportation: [employer]  Occupation: Retired  Cognition        Objective     Observation/Palpation  Posture: Fair  Observation: pt rec'd in chair, call light within reach, agreeable to therapy    PROM RLE (degrees)  RLE PROM: WNL  AROM RLE (degrees)  RLE AROM: WNL  PROM LLE (degrees)  LLE PROM: WNL  AROM LLE (degrees)  LLE AROM : WNL  Strength RLE  Strength RLE: WFL  Strength LLE  Strength LLE: WFL  Strength RUE  Strength RUE: WFL  Strength LUE  Strength LUE: WFL        Bed mobility  Supine to Sit: Independent  Sit to Supine: Independent  Scooting: Independent  Transfers  Sit to Stand: Independent  Stand to sit:  Independent  Bed to Chair: Independent  Stand Pivot Transfers: Independent  Lateral Transfers: Independent  Ambulation  Ambulation?: Yes  Ambulation 1  Surface: level tile  Device: No Device  Assistance: Independent  Gait Deviations: None     Balance  Posture: Good  Sitting - Static: Good  Sitting - Dynamic: Good  Standing - Static: Good  Standing - Dynamic: Good        Plan   Plan  Times per week: NPU  Safety Devices  Type of devices: Call light within reach, Gait belt, Left in bed, Nurse notified    Goals  Patient Goals   Patient goals : NPU Therapy Time   Individual Concurrent Group Co-treatment   Time In 1530         Time Out 1552         Minutes 22         Timed Code Treatment Minutes: 12 Minutes     Kirsten Machado, PT, DPT

## 2020-02-06 NOTE — PROGRESS NOTES
02/06/2020    PLT 70 02/06/2020    MCV 78.5 02/06/2020    MCH 24.0 02/06/2020    MCHC 30.6 02/06/2020    RDW 36.9 02/06/2020    NRBC 1 02/03/2020    METASPCT 5 02/05/2020    LYMPHOPCT 11 02/06/2020    MONOPCT 4 02/06/2020    MYELOPCT 13 01/02/2020    BASOPCT 0 02/06/2020    MONOSABS 0.59 02/06/2020    LYMPHSABS 1.63 02/06/2020    EOSABS 0.00 02/06/2020    BASOSABS 0.00 02/06/2020    DIFFTYPE NOT REPORTED 02/06/2020     BMP:    Lab Results   Component Value Date     02/06/2020    K 4.3 02/06/2020    CL 96 02/06/2020    CO2 26 02/06/2020    BUN 15 02/06/2020    LABALBU 4.6 01/02/2020    CREATININE 0.66 02/06/2020    CALCIUM 8.8 02/06/2020    GFRAA >60 02/06/2020    LABGLOM >60 02/06/2020    GLUCOSE 117 02/06/2020           Physical Exam:  Vitals: BP (!) 110/55   Pulse 69   Temp 98.4 °F (36.9 °C) (Tympanic)   Resp 18   Ht 6' (1.829 m)   Wt 222 lb 9.6 oz (101 kg)   SpO2 91%   BMI 30.19 kg/m²   24 hour intake/output:    Intake/Output Summary (Last 24 hours) at 2/6/2020 1338  Last data filed at 2/6/2020 1045  Gross per 24 hour   Intake --   Output 3100 ml   Net -3100 ml     Last 3 weights: Wt Readings from Last 3 Encounters:   02/04/20 222 lb 9.6 oz (101 kg)   01/21/20 222 lb (100.7 kg)   01/14/20 222 lb 3.2 oz (100.8 kg)     HEENT: Normocephalic and Atraumatic  Neck: Supple, No Masses, Tenderness, Nodularity and No Lymphadenopathy  Chest/Lungs: Clear to Auscultation without Rales, Rhonchi, or Wheezes  Cardiac: Regular Rate and Rhythm without Rubs, Clicks, Gallops, or Murmurs  GI/Abdomen: Bowel Sounds Present and Soft, Non-tender, without Guarding or Rebound Tenderness  : wayne catheter---pyridium orange clear yellow  EXT/Skin: No Edema, No Cyanosis and No Clubbing  Neuro: Alert and Oriented and No Localizing Signs/Symptoms      Assessment:    Active Problems:    Acute UTI    UTI (urinary tract infection)    Hypospadias    Recurrent UTI  Resolved Problems:    * No resolved hospital problems.  *    MICHELLE, JESUS Chan  Nora Laura, ; Kishore Cisneros, Urology]  FULL CODE    SCDs    no anticoagulation due to bleeding risk---anemia-polyps   SUPPLEMENTAL OXYGEN     ORELLANA--2.3.2020    Anti-infectives:  Rocephin IV--dcd, Unasyn IV---2. 1.2020, Vancomycin IV, Macrobid PO     POD _____ orellana catheter with cysto urethral dilatation---2. 3.2020---Marino---hypospadias  Aerococcus urinae UTI --POA---1.31.2020--fever--shaking chills---recurrent-frequent UTIs           Sepsis ruled out---1.31.2020           UCx--1.31.2020--[-]--antibiotics   Aerococcus urinae BLOOD CULTURE---[+]---2/3---1.31.2020          CXR---2.3.2020---cardiomegaly--chronic appearing interstitial changes--RLL atelectasis           2D ECHO---2.3.2020---moderately dilated LA--mildly dilated LV--low normal-to-normal                             LVSF--RA dilatation--MAC--mild MR--RVSP ~ 44 mm Hg----mild pulmonary                             hypertension--LVEF ~ 50-55%--no vegetations  CARLIN--obstructive voiding signs--symptoms---dysuria            CT abdomen-pelvis---2. 3.2020---asymmetric mass--like appearance of left seminal                              vesicle possible complex cyst--solid mass--glandular asymmetry---extensive                              prostate calcifications--distended urinary bladder---small posterolateral                              bladder diverticulum  Viral respiratory syndrome---possible---1.31.2020--onset 48 hr PTA            CXR----2. 3.2020---chronic changes--RLL atelectasis             CXR---2.2.2020---clear            CTA chest---2. 1.2020---no PE--multifocal airspace disease--right > left            CXR---1.31.2020---NACs            CXR---2. 1.2020--pulmonary vascular congestion  Fluid overload without failure---2. 1.2020--see above----hypoxia               EKG---2.3.2020---NSR--91---incomplete RBBB----RSR V1  Pulmonary hypertension---mild   Peripheral vascular disease             Left carotid bruit---1.31.2020 PWK-HB---2.95.8966---3-77% TAMEKA-LICA--patent antegrade vertebrals---                             up to  35% plaque at bifurcations bilaterally  Sleep apnea----APNEA LINK---2. 3.2020---2.4.2020---[+]  CKD---Stage 2  Anemia---likely source GI bleeding---in active work-up outpatient--hemoglobin 8.8 à 7.6 à 6.7              Hemoglobin = 6.7----2. 1.2020              1 UNIT PRBCs----2. 1.2020               Iron deficiency anemia--likely due to GI blood loss---polyps  Obesity  Tobacco abuse---1/2 PPD current--daily  Thrombocytopenia   Hypokalemia  Hypomagnesemia  Hyponatremia    Constipation   PMH:    ED, measles, mumps, UTIs, left undescended testicle--hypospadias   PSH:     EGD---1.14.2020, colonoscopy---1.14.2020---adenomatous                polyps---cecum--ascending colon--transverse colon---sigmoid                colon---one large sessile polyp, colonoscopy--2012, exploration---               undescended testicle---hypospadias repair---1979, maxillofacial--               mandible reconstruction    Allergies:    NKDA                           DO NOT REMOVE ORELLANA CATHETER                                Plan:  1. IV antibiotics cont'd for Aerococcus urinae  2. Topical treatment right antecubital fossa inflammation  3. Ambulate  4. Back pain--low dose Dilaudid  5. Bowel regimen  6. Orellana catheter to remain in place  7.   See orders     Electronically signed by Soraya Way on 2/6/2020 at [address] [place] [address]

## 2020-02-06 NOTE — PROGRESS NOTES
Pharmacy Vancomycin Consult     Vancomycin Day: 3  Current Dosin mg q 12h    Temp max:  98.9    Recent Labs     20  0543 20  0545   CREATININE 0.69* 0.66*     Estimated Creatinine Clearance: 132 mL/min (A) (based on SCr of 0.66 mg/dL (L)). Recent Labs     20  0543 20  0545   WBC 13.2* 14.8*       Culture Date      Source                Results  20              Blood              AEROCOCCUS URINAE     Trough: 14    Assessment/Plan:   Scr down just a bit today. Afebrile. UO is poor. Physician added [facility] yesterday. Trough level just under target however that will likely go up. Continue same dose for now.   Nadya Go  2020  2:27 PM

## 2020-02-06 NOTE — FLOWSHEET NOTE
Assessment: Patient was awake and oriented, alone in room. Patient continues to have support of spouse and family members. Patient appeared to be in pain or discomfort and was not very conversational.    Intervention: Provided listening presence. Outcome: Patient expressed gratitude for visit. Plan: Chaplains remain available for spiritual and emotional support. 02/06/20 1100   Encounter Summary   Services provided to: Patient   Referral/Consult From: [facility]   Support System Spouse; Family members   Continue Visiting   (2/6/20)   Complexity of Encounter Low   Length of Encounter 15 minutes   Spiritual/Advent   Type Spiritual support   Assessment Approachable   Intervention Active listening   Outcome Expressed gratitude   Electronically signed by Navarro Reyes on 2/6/2020 at 11:01 AM

## 2020-02-07 VITALS
OXYGEN SATURATION: 90 % | SYSTOLIC BLOOD PRESSURE: 116 MMHG | DIASTOLIC BLOOD PRESSURE: 63 MMHG | HEART RATE: 67 BPM | WEIGHT: 222.6 LBS | RESPIRATION RATE: 18 BRPM | BODY MASS INDEX: 30.15 KG/M2 | TEMPERATURE: 97.7 F | HEIGHT: 72 IN

## 2020-02-07 LAB
ABSOLUTE EOS #: 0 K/UL (ref 0–0.4)
ABSOLUTE IMMATURE GRANULOCYTE: 2.68 K/UL (ref 0–0.3)
ABSOLUTE LYMPH #: 1.49 K/UL (ref 1–4.8)
ABSOLUTE MONO #: 1.04 K/UL (ref 0.1–1.2)
ANION GAP SERPL CALCULATED.3IONS-SCNC: 13 MMOL/L (ref 9–17)
BASOPHILS # BLD: 0 % (ref 0–2)
BASOPHILS ABSOLUTE: 0 K/UL (ref 0–0.2)
BUN BLDV-MCNC: 17 MG/DL (ref 8–23)
BUN/CREAT BLD: 25 (ref 9–20)
CALCIUM SERPL-MCNC: 9.1 MG/DL (ref 8.6–10.4)
CHLORIDE BLD-SCNC: 93 MMOL/L (ref 98–107)
CO2: 28 MMOL/L (ref 20–31)
CREAT SERPL-MCNC: 0.67 MG/DL (ref 0.7–1.2)
DIFFERENTIAL TYPE: ABNORMAL
EOSINOPHILS RELATIVE PERCENT: 0 % (ref 1–8)
GFR AFRICAN AMERICAN: >60 ML/MIN
GFR NON-AFRICAN AMERICAN: >60 ML/MIN
GFR SERPL CREATININE-BSD FRML MDRD: ABNORMAL ML/MIN/{1.73_M2}
GFR SERPL CREATININE-BSD FRML MDRD: ABNORMAL ML/MIN/{1.73_M2}
GLUCOSE BLD-MCNC: 112 MG/DL (ref 70–99)
HCT VFR BLD CALC: 28.7 % (ref 40.7–50.3)
HEMOGLOBIN: 8.5 G/DL (ref 13–17)
IMMATURE GRANULOCYTES: 18 %
LYMPHOCYTES # BLD: 10 % (ref 15–43)
MCH RBC QN AUTO: 23.5 PG (ref 25.2–33.5)
MCHC RBC AUTO-ENTMCNC: 29.6 G/DL (ref 25.2–33.5)
MCV RBC AUTO: 79.5 FL (ref 82.6–102.9)
MONOCYTES # BLD: 7 % (ref 6–14)
MORPHOLOGY: ABNORMAL
NRBC AUTOMATED: 0.4 PER 100 WBC
PDW BLD-RTO: 37.1 % (ref 11.8–14.4)
PLATELET # BLD: 75 K/UL (ref 138–453)
PLATELET ESTIMATE: ABNORMAL
PMV BLD AUTO: ABNORMAL FL (ref 8.1–13.5)
POTASSIUM SERPL-SCNC: 4.1 MMOL/L (ref 3.7–5.3)
RBC # BLD: 3.61 M/UL (ref 4.21–5.77)
RBC # BLD: ABNORMAL 10*6/UL
SEG NEUTROPHILS: 65 % (ref 44–74)
SEGMENTED NEUTROPHILS ABSOLUTE COUNT: 9.69 K/UL (ref 1.8–7.7)
SODIUM BLD-SCNC: 134 MMOL/L (ref 135–144)
WBC # BLD: 14.9 K/UL (ref 3.5–11.3)
WBC # BLD: ABNORMAL 10*3/UL

## 2020-02-07 PROCEDURE — 94760 N-INVAS EAR/PLS OXIMETRY 1: CPT

## 2020-02-07 PROCEDURE — 99238 HOSP IP/OBS DSCHRG MGMT 30/<: CPT | Performed by: INTERNAL MEDICINE

## 2020-02-07 PROCEDURE — 2580000003 HC RX 258: Performed by: INTERNAL MEDICINE

## 2020-02-07 PROCEDURE — 6370000000 HC RX 637 (ALT 250 FOR IP): Performed by: INTERNAL MEDICINE

## 2020-02-07 PROCEDURE — 6370000000 HC RX 637 (ALT 250 FOR IP): Performed by: UROLOGY

## 2020-02-07 PROCEDURE — 80048 BASIC METABOLIC PNL TOTAL CA: CPT

## 2020-02-07 PROCEDURE — 6370000000 HC RX 637 (ALT 250 FOR IP): Performed by: NURSE PRACTITIONER

## 2020-02-07 PROCEDURE — 6360000002 HC RX W HCPCS: Performed by: UROLOGY

## 2020-02-07 PROCEDURE — 6360000002 HC RX W HCPCS: Performed by: INTERNAL MEDICINE

## 2020-02-07 PROCEDURE — 2580000003 HC RX 258: Performed by: UROLOGY

## 2020-02-07 PROCEDURE — 85025 COMPLETE CBC W/AUTO DIFF WBC: CPT

## 2020-02-07 PROCEDURE — 36415 COLL VENOUS BLD VENIPUNCTURE: CPT

## 2020-02-07 RX ORDER — LACTULOSE 10 G/15ML
SOLUTION ORAL
Qty: 1 BOTTLE | Refills: 0 | Status: SHIPPED | OUTPATIENT
Start: 2020-02-07 | End: 2020-01-01 | Stop reason: SDUPTHER

## 2020-02-07 RX ORDER — AMPICILLIN 500 MG/1
500 CAPSULE ORAL 4 TIMES DAILY
Qty: 40 CAPSULE | Refills: 0 | Status: SHIPPED | OUTPATIENT
Start: 2020-02-07 | End: 2020-01-01

## 2020-02-07 RX ORDER — NITROFURANTOIN 25; 75 MG/1; MG/1
100 CAPSULE ORAL EVERY 12 HOURS SCHEDULED
Qty: 20 CAPSULE | Refills: 0 | Status: SHIPPED | OUTPATIENT
Start: 2020-02-07 | End: 2020-01-01

## 2020-02-07 RX ORDER — POTASSIUM CHLORIDE 20 MEQ/1
40 TABLET, EXTENDED RELEASE ORAL 2 TIMES DAILY WITH MEALS
Qty: 60 TABLET | Refills: 0 | Status: SHIPPED | OUTPATIENT
Start: 2020-02-07 | End: 2020-01-01 | Stop reason: ALTCHOICE

## 2020-02-07 RX ORDER — FERROUS SULFATE 325(65) MG
325 TABLET ORAL
Qty: 30 TABLET | Refills: 0 | Status: SHIPPED | OUTPATIENT
Start: 2020-02-07 | End: 2020-01-01

## 2020-02-07 RX ORDER — GREEN TEA/HOODIA GORDONII 315-12.5MG
1 CAPSULE ORAL 3 TIMES DAILY
Qty: 30 TABLET | Refills: 0 | COMMUNITY
Start: 2020-02-07 | End: 2020-01-01

## 2020-02-07 RX ORDER — NICOTINE 21 MG/24HR
1 PATCH, TRANSDERMAL 24 HOURS TRANSDERMAL DAILY
Qty: 30 PATCH | Refills: 0 | COMMUNITY
Start: 2020-02-08 | End: 2020-01-01 | Stop reason: ALTCHOICE

## 2020-02-07 RX ORDER — PHENAZOPYRIDINE HYDROCHLORIDE 200 MG/1
200 TABLET, FILM COATED ORAL
Qty: 3 TABLET | Refills: 0 | Status: SHIPPED | OUTPATIENT
Start: 2020-02-07 | End: 2020-02-08

## 2020-02-07 RX ORDER — LIDOCAINE 50 MG/G
OINTMENT TOPICAL
Qty: 1 TUBE | Refills: 0 | Status: SHIPPED | OUTPATIENT
Start: 2020-02-07 | End: 2020-01-01 | Stop reason: ALTCHOICE

## 2020-02-07 RX ORDER — SODIUM CHLORIDE 1000 MG
1 TABLET, SOLUBLE MISCELLANEOUS
Status: DISCONTINUED | OUTPATIENT
Start: 2020-02-07 | End: 2020-02-07 | Stop reason: HOSPADM

## 2020-02-07 RX ORDER — LIDOCAINE 4 G/G
1 PATCH TOPICAL DAILY
Status: DISCONTINUED | OUTPATIENT
Start: 2020-02-07 | End: 2020-02-07 | Stop reason: HOSPADM

## 2020-02-07 RX ORDER — TAMSULOSIN HYDROCHLORIDE 0.4 MG/1
0.4 CAPSULE ORAL DAILY
Qty: 30 CAPSULE | Refills: 0 | Status: SHIPPED | OUTPATIENT
Start: 2020-02-08 | End: 2020-01-01 | Stop reason: SDUPTHER

## 2020-02-07 RX ORDER — TRAMADOL HYDROCHLORIDE 50 MG/1
50 TABLET ORAL EVERY 6 HOURS PRN
Qty: 10 TABLET | Refills: 0 | Status: SHIPPED | OUTPATIENT
Start: 2020-02-07 | End: 2020-01-01 | Stop reason: SDUPTHER

## 2020-02-07 RX ORDER — SODIUM CHLORIDE 1000 MG
1 TABLET, SOLUBLE MISCELLANEOUS ONCE
Status: COMPLETED | OUTPATIENT
Start: 2020-02-07 | End: 2020-02-07

## 2020-02-07 RX ORDER — BETHANECHOL CHLORIDE 25 MG/1
25 TABLET ORAL 4 TIMES DAILY
Qty: 90 TABLET | Refills: 0 | Status: SHIPPED | OUTPATIENT
Start: 2020-02-07 | End: 2020-01-01

## 2020-02-07 RX ADMIN — BETHANECHOL CHLORIDE 25 MG: 25 TABLET ORAL at 08:54

## 2020-02-07 RX ADMIN — FERROUS SULFATE TAB 325 MG (65 MG ELEMENTAL FE) 325 MG: 325 (65 FE) TAB at 08:54

## 2020-02-07 RX ADMIN — ACETAMINOPHEN 1000 MG: 500 TABLET, FILM COATED ORAL at 08:59

## 2020-02-07 RX ADMIN — SODIUM CHLORIDE TAB 1 GM 1 G: 1 TAB at 11:51

## 2020-02-07 RX ADMIN — NITROFURANTOIN MONOHYDRATE/MACROCRYSTALLINE 100 MG: 25; 75 CAPSULE ORAL at 08:54

## 2020-02-07 RX ADMIN — SODIUM CHLORIDE, PRESERVATIVE FREE 10 ML: 5 INJECTION INTRAVENOUS at 08:55

## 2020-02-07 RX ADMIN — AMPICILLIN SODIUM AND SULBACTAM SODIUM 1.5 G: 1; .5 INJECTION, POWDER, FOR SOLUTION INTRAMUSCULAR; INTRAVENOUS at 04:40

## 2020-02-07 RX ADMIN — LACTULOSE 20 G: 20 SOLUTION ORAL at 08:53

## 2020-02-07 RX ADMIN — Medication 10 ML: at 11:19

## 2020-02-07 RX ADMIN — VANCOMYCIN HYDROCHLORIDE 1750 MG: 1 INJECTION, POWDER, LYOPHILIZED, FOR SOLUTION INTRAVENOUS at 11:51

## 2020-02-07 RX ADMIN — FERROUS SULFATE TAB 325 MG (65 MG ELEMENTAL FE) 325 MG: 325 (65 FE) TAB at 11:51

## 2020-02-07 RX ADMIN — BETHANECHOL CHLORIDE 25 MG: 25 TABLET ORAL at 11:51

## 2020-02-07 RX ADMIN — POTASSIUM CHLORIDE 40 MEQ: 20 TABLET, EXTENDED RELEASE ORAL at 08:54

## 2020-02-07 RX ADMIN — FUROSEMIDE 40 MG: 10 INJECTION, SOLUTION INTRAMUSCULAR; INTRAVENOUS at 08:55

## 2020-02-07 RX ADMIN — AMPICILLIN SODIUM AND SULBACTAM SODIUM 1.5 G: 1; .5 INJECTION, POWDER, FOR SOLUTION INTRAMUSCULAR; INTRAVENOUS at 11:19

## 2020-02-07 RX ADMIN — PHENAZOPYRIDINE HYDROCHLORIDE 200 MG: 100 TABLET ORAL at 08:54

## 2020-02-07 RX ADMIN — TRAMADOL HYDROCHLORIDE 50 MG: 50 TABLET, FILM COATED ORAL at 04:47

## 2020-02-07 RX ADMIN — TAMSULOSIN HYDROCHLORIDE 0.4 MG: 0.4 CAPSULE ORAL at 08:54

## 2020-02-07 RX ADMIN — PHENAZOPYRIDINE HYDROCHLORIDE 200 MG: 100 TABLET ORAL at 11:51

## 2020-02-07 RX ADMIN — SODIUM CHLORIDE TAB 1 GM 1 G: 1 TAB at 09:52

## 2020-02-07 RX ADMIN — LACTULOSE 20 G: 20 SOLUTION ORAL at 14:39

## 2020-02-07 ASSESSMENT — PAIN SCALES - GENERAL
PAINLEVEL_OUTOF10: 2
PAINLEVEL_OUTOF10: 6
PAINLEVEL_OUTOF10: 3

## 2020-02-07 ASSESSMENT — PAIN DESCRIPTION - FREQUENCY
FREQUENCY: CONTINUOUS
FREQUENCY: CONTINUOUS

## 2020-02-07 ASSESSMENT — PAIN DESCRIPTION - ONSET
ONSET: ON-GOING
ONSET: ON-GOING

## 2020-02-07 ASSESSMENT — PAIN DESCRIPTION - PROGRESSION: CLINICAL_PROGRESSION: RAPIDLY WORSENING

## 2020-02-07 ASSESSMENT — PAIN DESCRIPTION - ORIENTATION
ORIENTATION: RIGHT
ORIENTATION: RIGHT

## 2020-02-07 ASSESSMENT — PAIN DESCRIPTION - DESCRIPTORS
DESCRIPTORS: ACHING
DESCRIPTORS: ACHING

## 2020-02-07 ASSESSMENT — PAIN DESCRIPTION - PAIN TYPE
TYPE: CHRONIC PAIN
TYPE: ACUTE PAIN

## 2020-02-07 ASSESSMENT — PAIN DESCRIPTION - LOCATION
LOCATION: BACK;HIP
LOCATION: BACK

## 2020-02-07 ASSESSMENT — PAIN - FUNCTIONAL ASSESSMENT: PAIN_FUNCTIONAL_ASSESSMENT: ACTIVITIES ARE NOT PREVENTED

## 2020-02-07 ASSESSMENT — PAIN DESCRIPTION - DIRECTION: RADIATING_TOWARDS: HIP

## 2020-02-07 NOTE — PROGRESS NOTES
tract infection)    Hypospadias    Recurrent UTI  Resolved Problems:    * No resolved hospital problems. *    MICHELLE,  [address] WM Alena Bradley, JEFF; Mortimer Hark, Urology]  FULL CODE    SCDs    no anticoagulation due to bleeding risk---anemia-polyps   SUPPLEMENTAL OXYGEN     ORELLANA--2.3.2020    Anti-infectives:  Rocephin IV--dcd, Unasyn IV---2. 1.2020, Vancomycin IV, Macrobid PO     POD _____ orellana catheter with cysto urethral dilatation---2. 3.2020---Marino---hypospadias  Aerococcus urinae UTI --POA---1.31.2020--fever--shaking chills---recurrent-frequent UTIs           Sepsis ruled out---1.31.2020           UCx--1.31.2020--[-]--antibiotics   Aerococcus urinae BLOOD CULTURE---[+]---2/3---1.31.2020          CXR---2.3.2020---cardiomegaly--chronic appearing interstitial changes--RLL atelectasis           2D ECHO---2.3.2020---moderately dilated LA--mildly dilated LV--low normal-to-normal                             LVSF--RA dilatation--MAC--mild MR--RVSP ~ 44 mm Hg----mild pulmonary                             hypertension--LVEF ~ 50-55%--no vegetations  CARLIN--obstructive voiding signs--symptoms---dysuria            CT abdomen-pelvis---2. 3.2020---asymmetric mass--like appearance of left seminal                              vesicle possible complex cyst--solid mass--glandular asymmetry---extensive                              prostate calcifications--distended urinary bladder---small posterolateral                              bladder diverticulum  Viral respiratory syndrome---possible---1.31.2020--onset 48 hr PTA            CXR----2. 3.2020---chronic changes--RLL atelectasis             CXR---2.2.2020---clear            CTA chest---2. 1.2020---no PE--multifocal airspace disease--right > left            CXR---1.31.2020---NACs            CXR---2. 1.2020--pulmonary vascular congestion  Fluid overload without failure---2. 1.2020--see above----hypoxia               EKG---2.3.2020---NSR--91---incomplete RBBB----RSR V1  Pulmonary hypertension---mild   Peripheral vascular disease             Left carotid bruit---1.31.2020             XZR-WR---[id number]---5-82% TAMEKA-LICA--patent antegrade vertebrals---                             up to  35% plaque at bifurcations bilaterally  Sleep apnea----APNEA LINK---2. 3.2020---2.4.2020---[+]  CKD---Stage 2  Anemia---likely source GI bleeding---in active work-up outpatient--hemoglobin 8.8 à 7.6 à 6.7              Hemoglobin = 6.7----2. 1.2020              1 UNIT PRBCs----2. 1.2020               Iron deficiency anemia--likely due to GI blood loss---polyps  Obesity  Tobacco abuse---1/2 PPD current--daily  Thrombocytopenia   Hypokalemia  Hypomagnesemia  Hyponatremia    Constipation   Back----hip pain---OA--DDD  PMH:    ED, measles, mumps, UTIs, left undescended testicle--hypospadias   PSH:     EGD---1.14.2020, colonoscopy---1.14.2020---adenomatous                polyps---cecum--ascending colon--transverse colon---sigmoid                colon---one large sessile polyp, colonoscopy--2012, exploration---               undescended testicle---hypospadias repair---1979, maxillofacial--               mandible reconstruction    Allergies:    NKDA                           DO NOT REMOVE WAYNE CATHETER    Patient received smoking cessation information during this hospitalization. Pt was given the Markel 57 number. Smoking cessation medication patient was given at discharge:  Nicotine Patch    Reason no smoking cessation medication given Not Applicable                       Plan:  1. Home----2.7.2020  2. UTI--Ampicillin---Macrobid  3. Urinary retention---wayne---Flomax--urecholine  4. Back pain---lidocaine gel---Orthopedics--Back  5. Sleep apnea---formal sleep study  6. Tobacco abuse---cessation---nicotine patch  7.   Bowel regimen  8. Potassium--sodium replacement  9. Probiotics  10. Pain---Tramadol  11. Follow up Michaele Osler,   12. Follow up Dr. Chadwick Birmingham, Urology  13.

## 2020-02-07 NOTE — PLAN OF CARE
Problem: Sensory:  Goal: General experience of comfort will improve  Description  General experience of comfort will improve   2/6/2020 2312 by Henry Schuler RN  Outcome: Ongoing  Note:   Patient complains of intermittent spasms. 2/6/2020 1018 by Alex Hudson RN  Outcome: Ongoing  2/6/2020 1016 by Alex Hudson RN  Reactivated     Problem: Urinary Elimination:  Goal: Signs and symptoms of infection will decrease  Description  Signs and symptoms of infection will decrease   2/6/2020 2312 by Henry Schuelr RN  Outcome: Ongoing  Note:   Patient has a wayne cath in place, intake and output is being assessed every 8 hours. 2/6/2020 1018 by Alex Hudson RN  Outcome: Ongoing  2/6/2020 1016 by Alex Hudson RN  Reactivated  Goal: Ability to reestablish a normal urinary elimination pattern will improve - after catheter removal  Description  Ability to reestablish a normal urinary elimination pattern will improve   2/6/2020 2312 by Henry Schuler RN  Outcome: Ongoing  2/6/2020 1016 by Alex Hudson RN  Reactivated  Goal: Complications related to the disease process, condition or treatment will be avoided or minimized  Description  Complications related to the disease process, condition or treatment will be avoided or minimized   2/6/2020 2312 by Henry Schuler RN  Outcome: Ongoing  2/6/2020 1016 by Alex Hudson RN  Reactivated     Problem: Falls - Risk of:  Goal: Will remain free from falls  Description  Will remain free from falls   2/6/2020 2312 by Henry Schuler RN  Outcome: Ongoing  Note:   Call light in reach, bed in lowest position, and bed alarm activated. Education given on use of call light before ambulation and when in need of assistance. Patient expressed understanding. Hourly visual checks performed and charted. Toileting offered to patient. No falls this shift, at any time. Arm band and falling star in place. Will continue to monitor.    2/6/2020 1018 by Maria Luisa Langston Taylor Farah RN  Outcome: Ongoing  2/6/2020 1016 by Roberta Lundberg RN  Reactivated  Goal: Absence of physical injury  Description  Absence of physical injury   2/6/2020 2312 by Chilango Tran RN  Outcome: Ongoing  2/6/2020 1018 by Roberta Lundberg RN  Outcome: Ongoing  2/6/2020 1017 by Roberta Lundberg RN  Reactivated     Problem: Discharge Planning:  Goal: Discharged to appropriate level of care  Description  Discharged to appropriate level of care   2/6/2020 2312 by Chilango Tran RN  Outcome: Ongoing  Note:   Discharge planning in process and discussed with patient/family. Social work consulted for any additional needs. Care manager aware of discharge needs. 2/6/2020 1017 by Roberta Lundberg RN  Reactivated     Problem: Pain:  Goal: Pain level will decrease  Description  Pain level will decrease  2/6/2020 2312 by Chilango Tran RN  Outcome: Ongoing  Note:   Patient able to use 0-10 pain scale. Complains of pain when assessed, when entering room patient is resting quietly in bed with eyes closed. Agreeable to take PRN pain medications, education provided to patient about pain medications. Declines to reposition/apply head or try any non-pharmacological pain methods. 2/6/2020 1018 by Roberta Lundberg RN  Outcome: Ongoing  Goal: Control of acute pain  Description  Control of acute pain  2/6/2020 2312 by Chilango Tran RN  Outcome: Ongoing  2/6/2020 1018 by Roberta Lundberg RN  Outcome: Ongoing  Goal: Control of chronic pain  Description  Control of chronic pain  Outcome: Ongoing     Problem: Skin Integrity:  Goal: Will show no infection signs and symptoms  Description  Will show no infection signs and symptoms  Outcome: Ongoing  Note:   No signs of skin breakdown. Skin warm, dry, and intact. Mucous membranes pink and moist.  Assistance with turns/ambulation provided PRN. Will continue to monitor. Care plan reviewed with patient.   Patient verbalize understanding of the plan of care and

## 2020-02-09 NOTE — DISCHARGE SUMMARY
[facility] [address]                 [address] [place] [address], [address] [place] [address]                               DISCHARGE SUMMARY    PATIENT NAME: Desean Aden                  :        1950  MED REC NO:   [id number]                             ROOM:       0217  ACCOUNT NO:   [de-identified]                           ADMIT DATE: 2020  PROVIDER:     Ruslan Walker                  DISCHARGE DATE: 2020    ATTENDING PHYSICIAN OF HOSPITALIZATION:  Sowmya Spangler MD    PERSONAL PHYSICIAN:  Barbi Juarez MD, Internal Medicine, [facility]. The patient seen by Som Cohen, Urology. DIAGNOSES:  1. Urinary tract infection with Aerococcus urinae blood culture  positivity 2 out of 3, 2020. Sepsis ruled out, 2020.  2.  Chest x-ray 2020, cardiomegaly, chronic appearing interstitial  changes, right lower lobe atelectasis. A 2D echo 2020, moderately  dilated LA, mildly dilated LV, low normal to normal LVEF, RA dilatation,  MAC, mild MR, RVSP 44 mmHg, mild pulmonary hypertension, left  ventricular ejection fraction 50% to 55%, no vegetations or other  evidence of endocarditis. 3.  Bladder outlet obstruction with obstructive signs and symptoms,  dysuria. The patient underwent a Cox catheter with stent placement  with cystourethral dilatation 2020, Dr. Arpita Hill, hypospadias together  with urethral stricture. CT scan of the abdomen and pelvis 2020,  asymmetric mass-like appearance to the left seminal vesicle, possible  confluent cyst solid mass, glandular asymmetry (congenital?), extensive  prostate calcifications, distended urinary bladder, small posterolateral  bladder diverticulum. 4.  Viral respiratory syndrome, possible, 2020, with onset 48  hours prior to admission. Chest x-ray 2020, chronic changes,  right lower lobe atelectasis. Chest x-ray 2020, clear.   CTA chest  2020, no PE, multifocal airspace disease became  available. The patient responded to this regimen, had episodic spiking  chills and fever. This resolved and the patient has been afebrile for  the last 48 hours and the patient's white cell count has stabilized at  14.9. The patient had been diagnosed with Aerococcus urinae in the  outpatient setting. Unfortunately, this information was not available  in the record other than the patient had been tried on a variety of  antibiotics including penicillin. The patient at the time of discharge  will be on ampicillin together with Macrobid. The patient appeared to have a possible viral syndrome at the time of  admission; this has cleared. The patient also had some episodes of some  volume overload with early resuscitated purposes, which required Lasix  and supplemental oxygen, now off oxygen. The patient found to have sleep apnea via ApneaLink and will require the  formal sleep study. Also, anemia has been an issue for him with  undergoing a workup in the outpatient setting, having polypectomy; there  are also some areas of sessile polyp, which is being monitored by Dr. Zenobia Vang of General Surgery. The patient has iron deficiency, at some point  was stopped on iron replacement, this has been reinitiated during this  hospitalization. Smoker of cigarettes of half a pack a day, has been advised to quit  absolutely and completely. Nicotine patch offered. Electrolyte disturbance with hypokalemia, hypomagnesemia, hyponatremia,  corrected. Noted to have thrombocytopenia, which may be due in part to the  patient's underlying infectious process. This has now stabilized,  having nadired at 70,000, now up to 75,000 around the time of discharge. Severe constipation, corrected with bowel regimen. LABORATORY DATA:  Around the time of discharge, white cell count 14.9,  hemoglobin 8.5, stable each, hematocrit 28.7, platelets 98,933.   Sodium  134, potassium 4.1, chloride 93, CO2 28, BUN 17, creatinine 0.67,  glucose 112, calcium was 9.1, and GFR greater than 60. DISCHARGE INSTRUCTIONS:  FOLLOWUP:  Discharged to home on 02/07/2020. DIET:  General given hyponatremia. ACTIVITY:  As tolerated. CONDITION AT DISCHARGE:  Improved, fair. MEDICATIONS, NEW:  Ampicillin 500 mg 4 times a day; bethanechol  (Urecholine) 25 mg 4 times a day; lactulose (Chronulac) 30 mL 3 times a  day, may titrate down when having regular bowel movements; lidocaine  patch 5% ointment topical to the back, on 12 hours, off 12 hours;  nicotine 21 mg per hour patch, topical, change daily; nitrofurantoin  (Macrobid) 100 mg p.o. b.i.d.; Pyridium (phenazopyridine) 200 mg p.o. 3  times a day with meals one additional day, then stop; potassium chloride  40 mEq p.o. b.i.d.; probiotic acidophilus one 3 times a day; Flomax  (tamsulosin) 0.4 mg p.o. daily; tramadol (Ultram) 50 mg p.o. every 6  hours p.r.n. pain. FOLLOWING MEDICATIONS FOR WHICH CHANGES MAY HAVE OCCURRED:  Ferrous  sulfate 325 mg p.o. 3 times a day with meals. SPECIFICALLY DISCONTINUED:  Bisacodyl 5 mg, Dulcolax, and Viagra  (sildenafil) due to the fact that the patient has been on Flomax,  interaction of which may result in low blood pressure. The patient's back pain appears to be that of osteoarthritis. The  patient to be seen by Orthopedics back in the outpatient setting. Other  followup in the outpatient setting, Claudean Butters, Internal Medicine, [facility]. Follow up with Ramon Fuentes, Urology. Note that the  patient's Cox catheter is not to be removed and that it required  placement with a scope. Follow up with Abelardo Moss, Orthopedic Surgery, back surgery. Outpatient sleep study for sleep apnea. The patient will require having  his CBC and basic metabolic panel monitored in the outpatient setting  given issues of anemia, thrombocytopenia, and electrolyte disturbance.     Any aspect of the patient's care not discussed in the chart and/or  dictation will be addressed and treated as an outpatient. The patient's medications have been reviewed including, but not limited  to, pre-hospital, hospital and discharge medications. The patient  and/or the patient's personal representatives were specifically advised  the only medications to be taken are those set forth in the discharge  orders and no other medications should be taken. Any prior medications  not on the discharge orders are specifically discontinued.         Van Burdick    D: 02/08/2020 11:21:07       T: 02/08/2020 13:51:31     [email or web address]  Job#: [id number]     Doc#: [id number]    CC:

## 2020-02-12 NOTE — OP NOTE
Patient:  Crissy Martin  MRN: [id number]  YOB: 1950    FACILITY: [address] [place] [address].:  2/3/2020    SURGEON: Liam Garcia MD     ASSISTANT: none    PREOPERATIVE DIAGNOSIS: acute UTI, anemia    POSTOPERATIVE DIAGNOSIS: acute UTI, urethral stricture    PROCEDURE PERFORMED: CYSTO URETHRAL DILATION  w/ Wayne placement    1. Cystourethroscopy. 2. Urethral dilation. 3. Difficult wayne placement    ANESTHESIA: Local    ESTIMATED BLOOD LOSS: 2 mL    COMPLICATIONS: None immediate    DRAINS: [address] [place] tip catheter    SPECIMENS: None    INDICATIONS FOR PROCEDURE:  The patient is a 72 y.o. male who presents today with acute UTI, anemia here for CYSTO URETHRAL DILATION  w/ Wayne placement. After risks, benefits and alternatives of the procedure were discussed with the patient, the patient elected to proceed. DETAILS OF THE PROCEDURE:  The patient was correctly identified in the preoperative holding area. He was brought back to the operating roomPatient was then prepped and draped in the usual sterile fashion. Once an appropriate time out had been performed, with all parties consenting, a flexible cystoscope was inserted into the patient's urethral meatus and we encountered a urethral stricture that was difficult to bypass with the scope. This was a pinpoint stricture in the pendulous urethra. We were able to pass a wire through this pinpoint stricture into the bladder. We elected to dilate with Cook-S dilators, from 8 fr to 20 fr.  We then advanced the cystoscope into the bladder. A pan cystoscopy was performed and the bladder was markedly distended and trabeculated. The visualization was very poor. No discrete tumors were identified. No bladder stones. We then placed a difficult wayne, 18 [place] catheter was placed over the glide wire. The patient tolerated the procedure well.        Plan:  The patient can be discharged home with wayne catheter  Follow up as

## 2020-02-12 NOTE — PROGRESS NOTES
Cardiology Consultation/Follow Up. [facility]    CC: Patient is here for follow up post hospitalization. WILIAN Bloom  is here for follow up post hospitalization. Was hospitalized with fevers. Has calcified enlarged prostate. Had urinary retention. Had some SOB and concern for HFpEF. He was given IV lasix in the hospital.   Has wayne in place for urinary retention with plan to see urology. As of now denies any sob, orthopnea, pnd, le edema. Seen in hospital for:  Hypoxia- add lasix 40 BID for 1-2 days  Echo reviewed, some diastolic dysfunction, normal EF  UTI- on Abx  Mild Sepsis- stable  Can follow up on discharge    Past Medical:  Past Medical History:   Diagnosis Date    Erectile dysfunction     Measles     Mumps     UTI (urinary tract infection)        Past Surgical:  Past Surgical History:   Procedure Laterality Date    COLONOSCOPY  2012    COLONOSCOPY N/A 1/14/2020    COLONOSCOPY performed by Shanna Rose DO at [address] 2/3/2020    [address]  w/ Wayne placement performed by Nolan Braswell MD at [address] [facility] ENDOSCOPY N/A 1/14/2020    EGD BIOPSY performed by Shanna Rose DO at [facility] OR       Family History:  Family History   Problem Relation Age of Onset    Colon Cancer Mother     Hypertension Father     Early Death Father         before age 48       Social History:  Social History     Tobacco Use    Smoking status: Current Every Day Smoker     Packs/day: 0.50     Types: Cigarettes    Smokeless tobacco: Never Used   Substance Use Topics    Alcohol use: Yes     Comment: 1 beer a week    Drug use: Never        REVIEW OF SYSTEMS:    · Constitutional: there has been no unanticipated weight loss. There's been No change in energy level, No change in activity level. · Eyes: No visual changes or diplopia. No scleral icterus.   · ENT: No Headaches, hearing loss or vertigo. No mouth sores or sore throat. · Cardiovascular: AS HPI  · Respiratory: AS HPI  · Gastrointestinal: No abdominal pain, appetite loss, blood in stools. No change in bowel or bladder habits. · Genitourinary: As hpi  · Musculoskeletal:  No gait disturbance, No weakness or joint complaints. · Integumentary: No rash or pruritis. · Neurological: No headache, diplopia, change in muscle strength, numbness or tingling. No change in gait, balance, coordination, mood, affect, memory, mentation, behavior. · Psychiatric: No new anxiety or depression. · Endocrine: No temperature intolerance. No excessive thirst, fluid intake, or urination. No tremor. · Hematologic/Lymphatic: No abnormal bruising or bleeding, blood clots or swollen lymph nodes. · Allergic/Immunologic: No nasal congestion or hives. Medications:  Current Outpatient Medications   Medication Sig Dispense Refill    traMADol (ULTRAM) 50 MG tablet Take 1 tablet by mouth every 6 hours as needed for Pain for up to 7 days.  28 tablet 0    lidocaine (XYLOCAINE) 5 % ointment Apply topically as needed to lower back 1 Tube 0    ferrous sulfate 325 (65 Fe) MG tablet Take 1 tablet by mouth 3 times daily (with meals) 30 tablet 0    lactulose (CHRONULAC) 10 GM/15ML solution Take 30 ml 3 times a day and my titrate down after having regular bowel movements 1 Bottle 0    potassium chloride (KLOR-CON M) 20 MEQ extended release tablet Take 2 tablets by mouth 2 times daily (with meals) 60 tablet 0    tamsulosin (FLOMAX) 0.4 MG capsule Take 1 capsule by mouth daily 30 capsule 0    nicotine (NICODERM CQ) 21 MG/24HR Place 1 patch onto the skin daily 30 patch 0    nitrofurantoin, macrocrystal-monohydrate, (MACROBID) 100 MG capsule Take 1 capsule by mouth every 12 hours for 10 days 20 capsule 0    ampicillin (PRINCIPEN) 500 MG capsule Take 1 capsule by mouth 4 times daily for 10 days 40 capsule 0    bethanechol (URECHOLINE) 25 MG tablet Take 1 tablet by mouth [facility]  [facility]. [facility]  [phone]

## 2020-02-13 NOTE — PROGRESS NOTES
Post-Discharge Transitional Care Management Services or Hospital Follow Up      Linda Jones   YOB: 1950    Date of Office Visit:  2/13/2020  Date of Hospital Admission: 1/31/20  Date of Hospital Discharge: 2/7/20  Readmission Risk Score(high >=14%.  Medium >=10%):Readmission Risk Score: 12      Care management risk score Rising risk (score 2-5) and Complex Care (Scores >=6): 1     Non face to face  following discharge, date last encounter closed (first attempt may have been earlier): 2/12/2020 10:40 AM 2/12/2020 10:40 AM    Call initiated 2 business days of discharge: Yes     Patient Active Problem List   Diagnosis    Tobacco use    Acute UTI    Erectile dysfunction    Anemia    Hiatal hernia    Polyp of ascending colon    Polyp of cecum    Polyp of transverse colon    Polyp of sigmoid colon    UTI (urinary tract infection)    Hypospadias    Recurrent UTI       No Known Allergies    Medications listed as ordered at the time of discharge from [facility] Medication Instructions [facility]:[id number]    Printed on:02/13/20 6147   Medication Information                      ampicillin (PRINCIPEN) 500 MG capsule  Take 1 capsule by mouth 4 times daily for 10 days             bethanechol (URECHOLINE) 25 MG tablet  Take 1 tablet by mouth 4 times daily             ferrous sulfate 325 (65 Fe) MG tablet  Take 1 tablet by mouth 3 times daily (with meals)             furosemide (LASIX) 20 MG tablet  Take 1 tablet by mouth daily as needed (Swelling, SOB, weight gain)             Lactobacillus (PROBIOTIC ACIDOPHILUS) TABS  Take 1 tablet by mouth 3 times daily             lactulose (CHRONULAC) 10 GM/15ML solution  Take 30 ml 3 times a day and my titrate down after having regular bowel movements             lidocaine (XYLOCAINE) 5 % ointment  Apply topically as needed to lower back             nitrofurantoin, macrocrystal-monohydrate, (MACROBID) 100 MG capsule  Take 1 capsule by mouth every 12 hours for 10 days             potassium chloride (KLOR-CON M) 20 MEQ extended release tablet  Take 2 tablets by mouth 2 times daily (with meals)             tamsulosin (FLOMAX) 0.4 MG capsule  Take 1 capsule by mouth daily             traMADol (ULTRAM) 50 MG tablet  Take 1 tablet by mouth every 6 hours as needed for Pain for up to 7 days. Medications marked \"taking\" at this time  Outpatient Medications Marked as Taking for the 2/13/20 encounter (Office Visit) with Brandi Oates MD   Medication Sig Dispense Refill    furosemide (LASIX) 20 MG tablet Take 1 tablet by mouth daily as needed (Swelling, SOB, weight gain) 90 tablet 1    traMADol (ULTRAM) 50 MG tablet Take 1 tablet by mouth every 6 hours as needed for Pain for up to 7 days.  28 tablet 0    lidocaine (XYLOCAINE) 5 % ointment Apply topically as needed to lower back 1 Tube 0    ferrous sulfate 325 (65 Fe) MG tablet Take 1 tablet by mouth 3 times daily (with meals) 30 tablet 0    lactulose (CHRONULAC) 10 GM/15ML solution Take 30 ml 3 times a day and my titrate down after having regular bowel movements 1 Bottle 0    potassium chloride (KLOR-CON M) 20 MEQ extended release tablet Take 2 tablets by mouth 2 times daily (with meals) 60 tablet 0    tamsulosin (FLOMAX) 0.4 MG capsule Take 1 capsule by mouth daily 30 capsule 0    nitrofurantoin, macrocrystal-monohydrate, (MACROBID) 100 MG capsule Take 1 capsule by mouth every 12 hours for 10 days 20 capsule 0    ampicillin (PRINCIPEN) 500 MG capsule Take 1 capsule by mouth 4 times daily for 10 days 40 capsule 0    bethanechol (URECHOLINE) 25 MG tablet Take 1 tablet by mouth 4 times daily 90 tablet 0    Lactobacillus (PROBIOTIC ACIDOPHILUS) TABS Take 1 tablet by mouth 3 times daily 30 tablet 0        Medications patient taking as of now reconciled against medications ordered at time of hospital discharge: Yes    Chief Complaint   Patient presents with    Follow-Up from [facility] UTI        HPI    Inpatient course: Discharge summary reviewed- see chart. Interval history/Current status: Was treated at the hospital for UTI, which is a recurrent problem. Was also treated for bladder outlet obstruction, and had a Cox catheter placed, and now has follow-up with urology. Moreover, was noted to have anemia at the hospital, and now has an appointment with general surgery for colonoscopy to assess for any bleed. Review of Systems    Vitals:    02/13/20 1134   BP: 116/76   Site: Left Upper Arm   Position: Sitting   Cuff Size: Large Adult   Pulse: 78   Resp: 16   Temp: 97.1 °F (36.2 °C)   TempSrc: Tympanic   Weight: 215 lb (97.5 kg)   Height: 6' 0.01\" (1.829 m)     Body mass index is 29.15 kg/m². Wt Readings from Last 3 Encounters:   02/13/20 215 lb (97.5 kg)   02/12/20 222 lb (100.7 kg)   02/04/20 222 lb 9.6 oz (101 kg)     BP Readings from Last 3 Encounters:   02/13/20 116/76   02/12/20 104/64   02/07/20 116/63       Physical Exam        Assessment/Plan:  1. Anemia, unspecified type  We will order blood work to assess anemia. Continue iron supplement for now. Follow-up with surgery for colonoscopy in March. Reassess afterwards. - CBC Auto Differential; Future  - Ferritin; Future  - Vitamin B12; Future  - Reticulocytes; Future  - Iron and TIBC; Future  - Folate RBC; Future  - DC DISCHARGE MEDS RECONCILED W/ CURRENT OUTPATIENT MED LIST    2. Frequent UTI  Follow-up with urology. Has Cox catheter in.   Will reassess after follow-up with urology  - DC DISCHARGE MEDS RECONCILED W/ CURRENT OUTPATIENT MED LIST        Medical Decision Making: moderate complexity

## 2020-02-14 NOTE — TELEPHONE ENCOUNTER
Received call from  Terri Freire at [facility]- Dr. Carlene Chong ordered a sleep study on discharge. Insurance has denied test, but will cover a Home Sleep Study. Order pended; please sign.

## 2020-02-17 NOTE — PROGRESS NOTES
Patchy ground-glass opacities are seen in the right upper lobe. Patchy and more confluent opacities are seen in the right lower lobe. On the left patchy ground-glass and parenchymal opacities are seen in the left upper and left lower lobe. No significant pleural fluid Small hiatal hernia seen. There is nonspecific thickening at the GE junction. There is mild thickening of the adrenal glands. There is mild body wall anasarca. Degenerative changes are seen in the spine and shoulder joints. Limited by motion. No pulmonary embolus. Multifocal airspace disease, greatest in the right lower lobe, suspicious for pneumonia     Vl Dup Carotid Bilateral    Result Date: 1/31/2020  EXAMINATION: ULTRASOUND EVALUATION OF THE CAROTID ARTERIES 1/31/2020 COMPARISON: None HISTORY: ORDERING SYSTEM PROVIDED HISTORY: Bruit TECHNOLOGIST PROVIDED HISTORY: Bruit Reason for Exam: Bruit Acuity: Acute Type of Exam: Initial FINDINGS: RIGHT: The right common carotid artery demonstrates peak systolic velocities of 673 and 133 cm/sec in the proximal and distal segments respectively. The right internal carotid artery demonstrates the systolic velocities of 544, 135, and 104 cm/sec in the proximal, mid and distal segments respectively. End-diastolic velocity in the mid ICA visualized 48 cm/second. Strict peak systolic velocity criteria suggests 50-69% stenosis without a corresponding visualized stenosis on grayscale images. The external carotid artery is patent with slight flow velocity acceleration. The vertebral artery demonstrates normal antegrade flow. Mild hard plaque at the bifurcation with measured stenosis of up to 35% in the proximal internal carotid artery. ICA/CCA ratio of 0.7. LEFT: The left common carotid artery demonstrates peak systolic velocities of 223 and 131 cm/sec in the proximal and distal segments respectively.  The left internal carotid artery demonstrates the systolic velocities of 859, 117, and 127 cm/sec in the proximal, mid and distal segments respectively. End-diastolic velocity in the distal ICA visualized is 48 cm/second. Borderline flow velocity acceleration is felt to be technical without a corresponding imaged stenosis identified. The external carotid artery is patent. The vertebral artery demonstrates normal antegrade flow. Mild hard plaque at the bifurcation with measured stenosis of up to 35% in the proximal internal carotid artery. ICA/CCA ratio of 1.0. The proximal right internal carotid artery demonstrates 0-50% stenosis. Borderline flow velocity acceleration mid ICA visualized without a corresponding imaged stenosis. The left internal carotid artery demonstrates 0-50% stenosis. Bilateral vertebral arteries are patent with flow in the normal direction. Mild hard plaque at each carotid bifurcation with measured stenosis of up to 35% in the proximal ICA bilaterally. PAST MEDICAL, FAMILY AND SOCIAL HISTORY:  Past Medical History:   Diagnosis Date    Erectile dysfunction     Measles     Mumps     UTI (urinary tract infection)      Past Surgical History:   Procedure Laterality Date    COLONOSCOPY  2012    COLONOSCOPY N/A 1/14/2020    COLONOSCOPY performed by Natan Chapman DO at [address] 2/3/2020    CYSTO URETHRAL DILATION  w/ Cox placement performed by Stefanie Brantley MD at [address] [facility]    UPPER GASTROINTESTINAL ENDOSCOPY N/A 1/14/2020    EGD BIOPSY performed by Natan Chapman DO at [place] [address] History   Problem Relation Age of Onset    Colon Cancer Mother     Hypertension Father     Early Death Father         before age 48     No outpatient medications have been marked as taking for the 2/17/20 encounter (Office Visit) with Stefanie Brantley MD.       Patient has no known allergies.   Social History     Tobacco Use   Smoking Status Former Smoker    Packs/day: 0.50    Types: Cigarettes    Last attempt to quit: 1/31/2020    Years since

## 2020-03-02 PROBLEM — N39.0 UTI (URINARY TRACT INFECTION): Status: RESOLVED | Noted: 2020-02-01 | Resolved: 2020-01-01

## 2020-03-02 NOTE — PROGRESS NOTES
SOHAM Deras MD        [address] [address]  Dept: [phone]  Dept Fax: [phone]  Loc: [phone]      [facility] Urology Office Note - Follow up Visit    Patient:  Anthony Shultz  YOB: 1950  Date: 3/2/2020    The patient is a 72 y.o. male who presents today for evaluation of the following problems: urethral stricture  Chief Complaint   Patient presents with    Urinary Retention     2 wk with pvr        HISTORY OF PRESENT ILLNESS:     Urethral stricture  Onset was  Days ago  Overall, the problem(s) are better. Severity is described as moderate to severe. Associated Symptoms: No dysuria, no gross hematuria. Current Pain Severity: 0    Here with catheter  S/p urethral dilation  Hx of hypospadias          Requested/reviewed records from [facility] office and/or outside physician/EMR    (Patient's old records have been requested, reviewed and pertinent findings summarized in today's note.)    Procedures Today: N/A    Last several PSA's:  Lab Results   Component Value Date    PSA 0.05 01/02/2020    PSA 0.08 12/20/2019       Last total testosterone:  No results found for: TESTOSTERONE    Urinalysis today:  No results found for this visit on 03/02/20. Last BUN and creatinine:  Lab Results   Component Value Date    BUN 17 02/07/2020     Lab Results   Component Value Date    CREATININE 0.67 (L) 02/07/2020       Additional Lab/Culture results: none    Imaging Reviewed during this Office Visit:   Júnior Chaparro MD independently reviewed the images and verified the radiology reports from:    Xr Chest Standard (2 Vw)    Result Date: 1/31/2020  EXAMINATION: TWO XRAY VIEWS OF THE CHEST 1/31/2020 9:38 am COMPARISON: Chest radiograph performed 05/21/2009.  HISTORY: ORDERING SYSTEM PROVIDED HISTORY: cough fever TECHNOLOGIST PROVIDED HISTORY: cough fever Reason for Exam: Fever; fatigue; Patchy ground-glass opacities are seen in the right upper lobe. Patchy and more confluent opacities are seen in the right lower lobe. On the left patchy ground-glass and parenchymal opacities are seen in the left upper and left lower lobe. No significant pleural fluid Small hiatal hernia seen. There is nonspecific thickening at the GE junction. There is mild thickening of the adrenal glands. There is mild body wall anasarca. Degenerative changes are seen in the spine and shoulder joints. Limited by motion. No pulmonary embolus. Multifocal airspace disease, greatest in the right lower lobe, suspicious for pneumonia     Vl Dup Carotid Bilateral    Result Date: 1/31/2020  EXAMINATION: ULTRASOUND EVALUATION OF THE CAROTID ARTERIES 1/31/2020 COMPARISON: None HISTORY: ORDERING SYSTEM PROVIDED HISTORY: Bruit TECHNOLOGIST PROVIDED HISTORY: Bruit Reason for Exam: Bruit Acuity: Acute Type of Exam: Initial FINDINGS: RIGHT: The right common carotid artery demonstrates peak systolic velocities of 426 and 133 cm/sec in the proximal and distal segments respectively. The right internal carotid artery demonstrates the systolic velocities of 854, 135, and 104 cm/sec in the proximal, mid and distal segments respectively. End-diastolic velocity in the mid ICA visualized 48 cm/second. Strict peak systolic velocity criteria suggests 50-69% stenosis without a corresponding visualized stenosis on grayscale images. The external carotid artery is patent with slight flow velocity acceleration. The vertebral artery demonstrates normal antegrade flow. Mild hard plaque at the bifurcation with measured stenosis of up to 35% in the proximal internal carotid artery. ICA/CCA ratio of 0.7. LEFT: The left common carotid artery demonstrates peak systolic velocities of 489 and 131 cm/sec in the proximal and distal segments respectively.  The left internal carotid artery demonstrates the systolic velocities of 144, 117, and 127 cm/sec in the proximal, mid and distal segments respectively. End-diastolic velocity in the distal ICA visualized is 48 cm/second. Borderline flow velocity acceleration is felt to be technical without a corresponding imaged stenosis identified. The external carotid artery is patent. The vertebral artery demonstrates normal antegrade flow. Mild hard plaque at the bifurcation with measured stenosis of up to 35% in the proximal internal carotid artery. ICA/CCA ratio of 1.0. The proximal right internal carotid artery demonstrates 0-50% stenosis. Borderline flow velocity acceleration mid ICA visualized without a corresponding imaged stenosis. The left internal carotid artery demonstrates 0-50% stenosis. Bilateral vertebral arteries are patent with flow in the normal direction. Mild hard plaque at each carotid bifurcation with measured stenosis of up to 35% in the proximal ICA bilaterally. PAST MEDICAL, FAMILY AND SOCIAL HISTORY:  Past Medical History:   Diagnosis Date    Erectile dysfunction     Measles     Mumps     UTI (urinary tract infection)      Past Surgical History:   Procedure Laterality Date    COLONOSCOPY  2012    COLONOSCOPY N/A 1/14/2020    COLONOSCOPY performed by Evon Ta DO at [address] 2/3/2020    CYSTO URETHRAL DILATION  w/ Cox placement performed by Scott Luna MD at [address] [place] [address]    UPPER GASTROINTESTINAL ENDOSCOPY N/A 1/14/2020    EGD BIOPSY performed by Evon Ta DO at [place] [address] History   Problem Relation Age of Onset    Colon Cancer Mother     Hypertension Father     Early Death Father         before age 48     Outpatient Medications Marked as Taking for the 3/2/20 encounter (Office Visit) with Scott Luna MD   Medication Sig Dispense Refill    tamsulosin (FLOMAX) 0.4 MG capsule Take 1 capsule by mouth daily 90 capsule 3    traMADol (ULTRAM) 50 MG tablet Take 50 mg by mouth every 6 hours as needed for Pain.       follow up if [place] in [place]. Otherwise, will need to establish urologic care in [facility].         Prescriptions Ordered:  Orders Placed This Encounter   Medications    tamsulosin (FLOMAX) 0.4 MG capsule     Sig: Take 1 capsule by mouth daily     Dispense:  90 capsule     Refill:  3      Orders Placed:  Orders Placed This Encounter   Procedures   Vanessa Bobo MD

## 2020-03-02 NOTE — PROGRESS NOTES
None     Emotionally abused: None     Physically abused: None     Forced sexual activity: None   Other Topics Concern    None   Social History Narrative    None         Objective:     Physical Exam:  Vitals:    03/02/20 1302   BP: 132/80   Site: Left Upper Arm   Position: Sitting   Cuff Size: Medium Adult   Weight: 218 lb (98.9 kg)   Height: 6' (1.829 m)     Pain Score:   5    Physical Exam  Constitutional:       Appearance: He is well-developed. HENT:      Head: Normocephalic and atraumatic. Cardiovascular:      Pulses: Normal pulses. Comments: Warm extremities. Normal capillary refill. Pulmonary:      Effort: Pulmonary effort is normal.   Skin:     General: Skin is warm and dry. Neurological:      Mental Status: He is alert and oriented to person, place, and time. Cranial Nerves: No cranial nerve deficit. Sensory: No sensory deficit. Motor: No atrophy or abnormal muscle tone. Deep Tendon Reflexes: Reflexes are normal and symmetric. Psychiatric:         Speech: Speech normal.         Behavior: Behavior normal.         Back Exam     Tenderness   The patient is experiencing tenderness in the lumbar.     Range of Motion   Extension: normal   Flexion: normal   Lateral bend right: normal   Lateral bend left: normal   Rotation right: normal   Rotation left: normal     Muscle Strength   Right Quadriceps:  5/5   Left Quadriceps:  5/5   Right Hamstrings:  5/5   Left Hamstrings:  5/5     Tests   Straight leg raise right: negative  Straight leg raise left: negative    Other   Toe walk: normal  Heel walk: normal  Sensation: normal  Gait: normal     Comments:  + kemps  - slump  -fabers   Nl              Labs:   Lab Results   Component Value Date    WBC 14.9 (H) 02/07/2020    HGB 8.5 (L) 02/07/2020    HCT 28.7 (L) 02/07/2020    PLT 75 (L) 02/07/2020    CHOL 136 01/02/2020    TRIG 36 01/02/2020    HDL 83 01/02/2020    ALT 16 01/02/2020    AST 19 01/02/2020     (L) 02/07/2020    K 4.1 02/07/2020    CL 93 (L) 02/07/2020    CREATININE 0.67 (L) 02/07/2020    BUN 17 02/07/2020    CO2 28 02/07/2020    PSA 0.05 01/02/2020    GLUF 106 (H) 01/02/2020       Assessment: This is a 72 y.o. male with the following diagnosis:     Pain Diagnoses:  1. Lumbosacral spondylosis without myelopathy    2. Lumbar radiculopathy    3. Acute pyelonephritis    4. Polyp of sigmoid colon, unspecified type        Medical/ Psychological Comorbidities:  As listed in the past medical and surgical history    Functional Limitations secondary to the above problems:  Chronic painlimits function and quality of life    Plan:   L5-S1 IESi x2  Off ASA   1. Meds:   New Prescriptions    No medications on file      No orders of the defined types were placed in this encounter. Controlled Substances Monitoring:    OARRS report was reviewed for [place], [place]. Pt educated about the risks of taking opiates, including increasedsedation, constipation, slowed breathing, tolerance, dependence, and addiction. No orders of the defined types were placed in this encounter. No follow-ups on file. The patient expressed understanding of the above assessment and plan. Totaltime spent face to face with patient was 25 minutes inwhich  50% or more of the time was spent in counseling, education about risk andbenefits of the above plan, and coordination of care.

## 2020-03-02 NOTE — PATIENT INSTRUCTIONS
pre-procedure antibiotic or other pre-procedure medications as instructed. 10. Bring inhalers and pain medications with you to your procedure. 11. Bring your MRI/CT films if they were done outside of the [facility]. 12. If you should develop a cold, sore throat, cough, fever or other new indication of illness or infection, or are started on antibiotics within 2 weeks of the scheduled procedure, please notify the [facility] office as early as possible at ([phone]. Patient Education        Learning About Lumbar Epidural Steroid Injections  What is a lumbar epidural steroid injection? A doctor may give you a lumbar epidural steroid injection to try to decrease pain, tingling, or numbness in your back, buttock, or leg. These might be the result of a back or disc problem. The injection goes directly into your epidural space. This is the area in your back around your spinal cord. This injection may have both a local anesthetic and a steroid medicine. Or it may only have a steroid. Local anesthetic medicines numb your nerves right away for a short time. Steroids reduce swelling and pain. But they take a few days to start working. Some people get a series of these injections over weeks or months. How is a lumbar epidural done? The doctor may use an imaging test before or during your injection. This can be an MRI, a CT scan, or an X-ray. These tests can show where your nerve problems are. After finding the right spot, the doctor may inject a numbing medicine into the skin where you will get the steroid injection. Then he or she puts the needle for the steroid into the numbed area. You may feel some pressure. You could feel some stinging or burning during the injection. It takes about 10 to 15 minutes to get this injection. You will probably go home about 20 to 30 minutes after you get it. You will need someone to drive you home.   What can you expect after a lumbar epidural?  If your If you do not have an account, please click on the \"Sign Up Now\" link. Current as of: June 26, 2019  Content Version: 12.3  © 4646-4285 [employer], [employer]. Care instructions adapted under license by [facility] ([facility]). If you have questions about a medical condition or this instruction, always ask your healthcare professional. [address] any warranty or liability for your use of this information.

## 2020-03-03 NOTE — PROGRESS NOTES
Physical Therapy  Initial Assessment  Date: 3/3/2020  Patient Name: James Agarwal  MRN: [id number]  : 1950     Treatment Diagnosis: lumbar post derangement. M54.5 LBP    Restrictions- none       Subjective   General  Chart Reviewed: Yes  Patient assessed for rehabilitation services?: Yes  Additional Pertinent Hx: [address] admission 20 - 20 for UTI  Response To Previous Treatment: Not applicable  Family / Caregiver Present: No  Referring Practitioner: Gay January  Referral Date : 20  Diagnosis: M54.5 LBP. Also R hip pain  Follows Commands: Within Functional Limits  PT Visit Information  Onset Date: 20  PT Insurance Information: Aetna Medicare  Subjective  Subjective: Spent a week in the hospital with UTI. Prior to that admission was walking 5 miles. Pain Screening  Patient Currently in Pain: Yes  Pain Assessment  Pain Assessment: 0-10  Pain Level: 4  Patient's Stated Pain Goal: 1  Pain Type: Acute pain  Pain Location: Back;Buttocks  Pain Orientation: Right  Pain Radiating Towards: Back thru R cheek  Pain Descriptors: Aching; Sharp  Pain Frequency: Intermittent  Pain Onset: Progressive  Clinical Progression: Gradually improving  Functional Pain Assessment: Prevents or interferes some active activities and ADLs  Vital Signs  Patient Currently in Pain: Yes    Vision/Hearing  Vision  Vision: Within Functional Limits  Hearing  Hearing: Within functional limits    Orientation  Orientation  Overall Orientation Status: Within Normal Limits    Social/Functional History  Social/Functional History  Lives With: Spouse  Type of Home: House  Home Layout: One level  ADL Assistance: Independent  Homemaking Assistance: Independent  Ambulation Assistance: Independent  Transfer Assistance: Independent  Active : Yes  Mode of Transportation: Car  Occupation: Retired  Type of occupation: Retired     Objective     Observation/Palpation  Posture: Fair(Leans to R in a chair)    PROM RLE (degrees)  R Hip Flexion 0-125: 95  R Hip Extension 0-10: 0  R Hip ABduction 0-45: 25  R Hip External Rotation 0-45: 20  R Hip Internal Rotation 0-45: 0  Spine  Lumbar: Flexion min loss to distal shin. Extension major loss of 70%. B side glide major loss  Special Tests: FIS, RFIS increase, worse. EIS, SUMIT produce, no worse. MAGALIE, RFIL increase, worse. EIL, REIL decrease, better, & increased ROM. R SGIL decrease, better  Joint Mobility  Spine: + lumbar post derangement    Strength RLE  Strength RLE: WNL  Strength LLE  Strength LLE: WNL     Additional Measures  Special Tests: R Slump + dural tension into R buttock. Other: R SLR +dural tension 40 deg into R buttock. L SLR 60 deg, negative dural tension(+ Pain cough/sneeze)        Assessment   Conditions Requiring Skilled Therapeutic Intervention  Body structures, Functions, Activity limitations: Decreased strength;Decreased ROM; Increased pain;Decreased endurance  Treatment Diagnosis: lumbar post derangement. M54.5 LBP  Prognosis: Good  Decision Making: Low Complexity  REQUIRES PT FOLLOW UP: Yes  Activity Tolerance  Activity Tolerance: Patient Tolerated treatment well         Plan   Plan  Times per week: 2  Plan weeks: 4  Current Treatment Recommendations: Strengthening, ROM, Manual Therapy - Joint Manipulation, Home Exercise Program, Modalities    OutComes Score   Oswestry LBP Scale 36/50 = 72%          Goals  Short term goals  Time Frame for Short term goals: 1 week  Short term goal 1: Start HEP  Long term goals  Time Frame for Long term goals : 4 weeks  Long term goal 1: Pain controlled 2/10 to allow return to regular ADL  Long term goal 2: Resume walking , up to 60 min , for health & wellness  Long term goal 3: Able to bend to put on shoes, lift objects.   Long term goal 4: Proper nightly sleep  Long term goal 5: Minimize further medical intervention       Therapy Time   Individual Concurrent Group Co-treatment   Time In 1254         Time Out 1335         Minutes 41 James Gorman, PT

## 2020-03-03 NOTE — FLOWSHEET NOTE
Physical Therapy Daily Treatment Note    Date:  3/3/2020    Patient Name:  Marty Story    :  1950  MRN: [id number]  Restrictions/Precautions:     Medical/Treatment Diagnosis Information:   · Diagnosis: M54.5 LBP. Also R hip pain  · Treatment Diagnosis: lumbar post derangement. M54.5 LBP  Insurance/Certification information:  PT Insurance Information: Aetna Medicare  Physician Information:  Referring Practitioner: Ekaterina Baker of care signed (Y/N): n   Visit# / total visits:  Pain level: 4-6/10     Time In:12:54   Time Out:1:35    Progress Note: [x]  Yes  []  No  Next due by: Visit #8, or 20      Subjective:   See eval    Objective: See eval  Observation:   Test measurements:      Exercises: there ex for lumbar ROM, reduction of post derangement  Exercise/Equipment Resistance/Repetitions Other comments   Lay prone in R SG 2'    Prone on elbows 2' x2    Press up 10x x2    B PKF 10x x2    Hip ext in prone     Modified extension 10x x2    Back Bend 10x x2         Hip ext, abd           Lumbar roll  2'    P/A mob 2'              [x] Provided verbal/tactile cueing for activities related to strengthening, flexibility, endurance, ROM. (50688)  [] Provided verbal/tactile cueing for activities related to improving balance, coordination, kinesthetic sense, posture, motor skill, proprioception. (59803)    Therapeutic Activities:     [] Therapeutic activities, direct (one-on-one) patient contact (use of dynamic activities to improve functional performance). (87083)    Gait:   [] Provided training and instruction to the patient for ambulation re-education. (95731)    Self-Care/ADL's  [] Self-care/home management training and compensatory training, meal preparation, safety procedures, and instructions in use of assistive technology devices/adaptive equipment, direct one-on-one contact.  (40279)    Home Exercise Program:   Lumbar extension progression for post derangement  [x] Reviewed/Progressed HEP activities

## 2020-03-06 NOTE — PROGRESS NOTES
Patient ID: Winston Hubbard is a 72 y.o. male. Chief Complaint   Patient presents with    Lower Back Pain     MRI results        HPI    Past Medical History:   Diagnosis Date    Erectile dysfunction     Measles     Mumps     UTI (urinary tract infection)      Past Surgical History:   Procedure Laterality Date    COLONOSCOPY      COLONOSCOPY N/A 2020    COLONOSCOPY performed by Christa Story DO at [address] 2/3/2020    CYSTO URETHRAL DILATION  w/ Cox placement performed by Ramon Fuentes MD at [facility]    UPPER GASTROINTESTINAL ENDOSCOPY N/A 2020    EGD BIOPSY performed by Christa Story DO at [facility] OR     Family History   Problem Relation Age of Onset    Colon Cancer Mother     Hypertension Father     Early Death Father         before age 48     Social History     Occupational History    Not on file   Tobacco Use    Smoking status: Former Smoker     Packs/day: 0.50     Types: Cigarettes     Last attempt to quit: 2020     Years since quittin.0    Smokeless tobacco: Never Used   Substance and Sexual Activity    Alcohol use: Yes     Comment: 1 beer a week    Drug use: Never    Sexual activity: Not on file        Review of Systems         Physical Exam    Assessment:          1. Acute low back pain without sciatica, unspecified back pain laterality        Plan:          No orders of the defined types were placed in this encounter. Jeremy Remy MD    Please note that this chart was generated using voicerecognition Dragon dictation software. Although every effort was made to ensurethe accuracy of this automated transcription, some errors in transcription may haveoccurred.
Status: He is alert and oriented to person, place, and time. Sensory: No sensory deficit. Psychiatric:         Behavior: Behavior normal.         Thought Content: Thought content normal.     MRI lumbar spine shows age appropriate lumbar spondylosis without high-grade stenosis no signs of significant instabilities or nerve impingement    Assessment:          1. Acute low back pain without sciatica, unspecified back pain laterality    Acute low back pain resolved    Lumbar spondylosis age-appropriate        Plan:     Follow-up as needed    No orders of the defined types were placed in this encounter. Yajaira Crews MD    Please note that this chart was generated using voicerecognition Dragon dictation software. Although every effort was made to ensurethe accuracy of this automated transcription, some errors in transcription may haveoccurred.

## 2020-03-06 NOTE — FLOWSHEET NOTE
Physical Therapy Daily Treatment Note    Date:  3/6/2020    Patient Name:  Winston Hubbard    :  1950  MRN: [id number]  Restrictions/Precautions:     Medical/Treatment Diagnosis Information:   · Diagnosis: M54.5 LBP. Also R hip pain  · Treatment Diagnosis: lumbar post derangement. M54.5 LBP  Insurance/Certification information:  PT Insurance Information: Aetna Medicare  Physician Information:  Referring Practitioner: Cristine Murrieta of care signed (Y/N): n   Visit# / total visits:  Pain level: 3/10     Time In:10:32   Time OHM:79:14    Progress Note: []  Yes  [x]  No  Next due by: Visit #8, or 20      Subjective: Pt reports soreness after initial evaluation. Reports the MRI was clear, and nothing of concern. Notes improvement in pain and function. Pain comes and goes instead of being constant. Objective: there ex for lumbar ROM, reduction of post derangement. Slight decrease in pain with prone laying and prone prop. Observation:   50% of full motion with PKF  Tenderness to palpation R side of sacrum. Fair tolerance to P/A mobs secondary to pain  Demos decreased heel strike toe off with gait, able to correct with VC      Test measurements:      Exercises:  Exercise/Equipment Resistance/Repetitions Other comments   Lay prone in R SG 5'    Prone on elbows 2' x2    Press up 10x x2    B PKF 10x x2    Hip ext in prone     Modified extension 10x x2    Back Bend 10x x2         Hip ext, abd 10x           Lumbar roll  2'    P/A mob 2'         TDM  5'     [x] Provided verbal/tactile cueing for activities related to strengthening, flexibility, endurance, ROM. (45747)  [] Provided verbal/tactile cueing for activities related to improving balance, coordination, kinesthetic sense, posture, motor skill, proprioception. (84915)    Therapeutic Activities:     [] Therapeutic activities, direct (one-on-one) patient contact (use of dynamic activities to improve functional performance).  (35021)    Gait:   [] Provided training and instruction to the patient for ambulation re-education. (54162)    Self-Care/ADL's  [] Self-care/home management training and compensatory training, meal preparation, safety procedures, and instructions in use of assistive technology devices/adaptive equipment, direct one-on-one contact. (75505)    Home Exercise Program:   Lumbar extension progression for post derangement  [x] Reviewed/Progressed HEP activities related to strengthening, flexibility, endurance, ROM. (12738)  [] Reviewed/Progressed HEP activities related to improving balance, coordination, kinesthetic sense, posture, motor skill, proprioception.  (31564)    Manual Treatments:    [] Provided manual therapy to mobilize soft tissue/joints for the purpose of modulating pain, promoting relaxation,  increasing ROM, reducing/eliminating soft tissue swelling/inflammation/restriction, improving soft tissue extensibility. (92957)    Service Based Modalities:  13' IFC for pain control     Timed Code Treatment Minutes:   31' There ex     Total Treatment Minutes:   55'    Treatment/Activity Tolerance:  [x] Patient tolerated treatment well [] Patient limited by fatique  [] Patient limited by pain  [] Patient limited by other medical complications  [] Other:     Prognosis: [x] Good [] Fair  [] Poor    Patient Requires Follow-up: [x] Yes  [] No      Goals:  Short term goals  Time Frame for Short term goals: 1 week  Short term goal 1: Start HEP    Long term goals  Time Frame for Long term goals : 4 weeks  Long term goal 1: Pain controlled 2/10 to allow return to regular ADL  Long term goal 2: Resume walking , up to 60 min , for health & wellness  Long term goal 3: Able to bend to put on shoes, lift objects.   Long term goal 4: Proper nightly sleep  Long term goal 5: Minimize further medical intervention          Plan:   [] Continue per plan of care [] Alter current plan (see comments)  [x] Plan of care initiated [] Hold pending MD visit [] Discharge  Plan for Next Session:   [facility]    Electronically signed by:  Petrona Chaparro

## 2020-03-11 NOTE — PROGRESS NOTES
I have reviewed and agree to the content of the note written by the PTA.   Electronically signed by Steven Calderon PT [id number]

## 2020-03-12 NOTE — TELEPHONE ENCOUNTER
Patients wife called in requesting a refill on lactulose sent to [facility]. Medication pended if agreeable.

## 2020-03-12 NOTE — FLOWSHEET NOTE
contact (use of dynamic activities to improve functional performance). (70171)    Gait:   [] Provided training and instruction to the patient for ambulation re-education. (19182)    Self-Care/ADL's  [] Self-care/home management training and compensatory training, meal preparation, safety procedures, and instructions in use of assistive technology devices/adaptive equipment, direct one-on-one contact. (50583)    Home Exercise Program:   Lumbar extension progression for post derangement  [x] Reviewed/Progressed HEP activities related to strengthening, flexibility, endurance, ROM. (81331)  [] Reviewed/Progressed HEP activities related to improving balance, coordination, kinesthetic sense, posture, motor skill, proprioception.  (56928)    Manual Treatments:    [] Provided manual therapy to mobilize soft tissue/joints for the purpose of modulating pain, promoting relaxation,  increasing ROM, reducing/eliminating soft tissue swelling/inflammation/restriction, improving soft tissue extensibility. (48138)    Service Based Modalities:       Timed Code Treatment Minutes:   45' There ex     Total Treatment Minutes:   45'    Treatment/Activity Tolerance:  [x] Patient tolerated treatment well [] Patient limited by fatique  [] Patient limited by pain  [] Patient limited by other medical complications  [] Other:     Prognosis: [x] Good [] Fair  [] Poor    Patient Requires Follow-up: [x] Yes  [] No      Goals:  Short term goals  Time Frame for Short term goals: 1 week  Short term goal 1: Start HEP met    Long term goals  Time Frame for Long term goals : 4 weeks  Long term goal 1: Pain controlled 2/10 to allow return to regular ADL  Long term goal 2: Resume walking , up to 60 min , for health & wellness  Long term goal 3: Able to bend to put on shoes, lift objects.   Long term goal 4: Proper nightly sleep  Long term goal 5: Minimize further medical intervention    Plan:   [x] Continue per plan of care [] Alter current plan (see

## 2020-03-16 NOTE — PROGRESS NOTES
[place] [address]  Dept: [phone]  Dept Fax: [phone]  Loc: [phone]     Visit Date:  3/16/2020    Patient:  Sydni Shields  YOB: 1950  Provider: Victoria Fournier MD      NEXT VISIT: Follow-up on CBC      HPI:   He was seen in the internal medicine office today for   Chief Complaint   Patient presents with    Follow-up     1 month          Presents to follow-up. Was shown to be anemic on last blood work, as well as this blood work. Hemoglobin is 8.4. Ferritin is elevated, TIBC is low, reticulocytes are low, B12 and folate are normal, MCV is also low: Which is all just if of anemia of chronic inflammation. Has been experiencing UTIs for the last several months, which might be the cause of his anemia. However, his white blood count has been elevated recently, which could be caused by infection or could mean that there is a blood disorder. Patient just feels tired, but denies shortness of breath, palpitations, lightheadedness. He is going to move to [place] next month. I advised that we can either repeat the blood work, or have him see a hematologist but he is unsure because of the move to [place].           Subjective:      REVIEW OF SYSTEMS    Constitutional: Denies fever, chills  Eyes: Denies recent vision changes  Cardiovascular: Denies chest pain, LL edema  Respiratory: Denies cough, wheezes  Gastrointestinal: Denies abdominal pain, nausea, vomiting  Skin: Denies rash  Endocrine: Denies polyuria  Hematologic: Denies bruising  Genitourinary: Denies dysuria, hematuria  Neurological: Denies headache, numbness        Medications    Current Outpatient Medications:     lactulose (CHRONULAC) 10 GM/15ML solution, Take 30 ml 3 times a day and my titrate down after having regular bowel movements, Disp: 1 Bottle, Rfl: 1    tamsulosin (FLOMAX) 0.4 MG capsule, Take 1 capsule by mouth daily, Disp: 90 capsule, Rfl: 3    furosemide (LASIX) 20 MG tablet, Take 1 tablet by mouth daily as needed (Swelling, SOB, weight gain), Disp: 90 tablet, Rfl: 1    ferrous sulfate 325 (65 Fe) MG tablet, Take 1 tablet by mouth 3 times daily (with meals), Disp: 30 tablet, Rfl: 0    traMADol (ULTRAM) 50 MG tablet, Take 50 mg by mouth every 6 hours as needed for Pain., Disp: , Rfl:     The patient has No Known Allergies. Past Medical History  Iain Parnell  has a past medical history of Erectile dysfunction, Measles, Mumps, and UTI (urinary tract infection). Past Surgical History  The patient  has a past surgical history that includes Exploration of undescended testicle (1979); Colonoscopy (2012); Upper gastrointestinal endoscopy (N/A, 1/14/2020); Colonoscopy (N/A, 1/14/2020); and Cystoscopy (N/A, 2/3/2020). Family History  This patient's family history includes Colon Cancer in his mother; Early Death in his father; Hypertension in his father. Social History  Iain Parnell  reports that he quit smoking about 6 weeks ago. His smoking use included cigarettes. He smoked 0.50 packs per day. He has never used smokeless tobacco. He reports current alcohol use. He reports that he does not use drugs.     Health Maintenance:    Health Maintenance   Topic Date Due    Hepatitis C screen  1950    DTaP/Tdap/Td vaccine (1 - Tdap) 02/07/1970    Shingles Vaccine (1 of 2) 02/07/2001    Pneumococcal 65+ years Vaccine (1 of 1 - PPSV23) 02/07/2016    Flu vaccine (1) 09/01/2019    Annual Wellness Visit (AWV)  09/24/2019    Potassium monitoring  02/07/2021    Creatinine monitoring  02/07/2021    Diabetes screen  01/02/2023    Lipid screen  01/02/2025    Colon cancer screen colonoscopy  01/14/2030    AAA screen  Completed    Hepatitis A vaccine  Aged Out    Hepatitis B vaccine  Aged Out    Hib vaccine  Aged Out    Meningococcal (ACWY) vaccine  Aged Out           Objective:     PHYSICAL EXAM  /68 (Site: maintenance. Electronically signed Fiordaliza Burdick MD on 3/16/2020 at 3:45 PM      This note has been created using the Epic electronic health record, and dictated in part by [employer] dictation system. Despite the documenting physician's best efforts, there may be errors in spelling, grammar or syntax.

## 2020-03-16 NOTE — FLOWSHEET NOTE
Physical Therapy Daily Treatment Note    Date:  3/16/2020    Patient Name:  Neris Douglass" :  1950  MRN: [id number]  Restrictions/Precautions:     Medical/Treatment Diagnosis Information:   · Diagnosis: M54.5 LBP. Also R hip pain  · Treatment Diagnosis: lumbar post derangement. M54.5 LBP  Insurance/Certification information:  PT Insurance Information: Aetna Medicare  Physician Information:  Referring Practitioner: Kaz Mcconnell of care signed (Y/N): Y   Visit# / total visits:    Pain level: 1/10     Time In: 9:36   Time Out: 10:17    Progress Note: []  Yes  [x]  No  Next due by: Visit #8, or 20      Subjective:Doing better. Has been able to get back to walking 2 miles. Now able to put on shoes in normal tolerance    Objective:  Observation:   Stands in forward flexed posture  Test measurements:    Prone Press up thru 90%  Lumbar Extension much improved  Pain is centralized. Exercises:  Exercise/Equipment Resistance/Repetitions Other comments   Lay prone in R SG 5'    Prone on elbows 5'    Press up 10x x2    B PKF 10x x2    Hip ext in prone 10x x2    Prone Hip IR/ER 15x manually        LTR 10xR, 10xL    Bridges FA/FT 10x ea         Modified extension 10x x2    Back Bend 10x x2         Standing Hip ext, abd 15x          Lumbar roll      P/A mob 3'         TM retro  5' @ 0.5mph    [x] Provided verbal/tactile cueing for activities related to strengthening, flexibility, endurance, ROM. (42984)  [] Provided verbal/tactile cueing for activities related to improving balance, coordination, kinesthetic sense, posture, motor skill, proprioception. (78590)    Therapeutic Activities:     [] Therapeutic activities, direct (one-on-one) patient contact (use of dynamic activities to improve functional performance). (43484)    Gait:   [] Provided training and instruction to the patient for ambulation re-education.  (71487)    Self-Care/ADL's  [] Self-care/home management training and compensatory training, meal preparation, safety procedures, and instructions in use of assistive technology devices/adaptive equipment, direct one-on-one contact. (15179)    Home Exercise Program:   Lumbar extension progression for post derangement  [x] Reviewed/Progressed HEP activities related to strengthening, flexibility, endurance, ROM. (33315)  [] Reviewed/Progressed HEP activities related to improving balance, coordination, kinesthetic sense, posture, motor skill, proprioception.  (87699)    Manual Treatments:    [] Provided manual therapy to mobilize soft tissue/joints for the purpose of modulating pain, promoting relaxation,  increasing ROM, reducing/eliminating soft tissue swelling/inflammation/restriction, improving soft tissue extensibility. (93654)    Service Based Modalities:       Timed Code Treatment Minutes:   40' There ex     Total Treatment Minutes:   36'    Treatment/Activity Tolerance:  [x] Patient tolerated treatment well [] Patient limited by fatique  [] Patient limited by pain  [] Patient limited by other medical complications  [] Other:     Prognosis: [x] Good [] Fair  [] Poor    Patient Requires Follow-up: [x] Yes  [] No      Goals:  Short term goals  Time Frame for Short term goals: 1 week  Short term goal 1: Start HEP met    Long term goals  Time Frame for Long term goals : 4 weeks  Long term goal 1: Pain controlled 2/10 to allow return to regular ADL  Long term goal 2: Resume walking , up to 60 min , for health & wellness  Long term goal 3: Able to bend to put on shoes, lift objects. Long term goal 4: Proper nightly sleep  Long term goal 5: Minimize further medical intervention    Plan:   [x] Continue per plan of care [] Alter current plan (see comments)  [] Plan of care initiated [] Hold pending MD visit [] Discharge    Plan for Next Session:   Progress as tolerated.      Electronically signed by:  John Gamez PT

## 2020-03-18 NOTE — ANESTHESIA PRE PROCEDURE
Department of Anesthesiology  Preprocedure Note       Name:  Sherman Aguero   Age:  72 y.o.  :  1950                                          MRN:  [id number]         Date:  3/18/2020      Surgeon: Suzie Briones):  Teri Ji DO    Procedure: COLONOSCOPY (N/A )    Medications prior to admission:   Prior to Admission medications    Medication Sig Start Date End Date Taking? Authorizing Provider   lactulose (CHRONULAC) 10 GM/15ML solution Take 30 ml 3 times a day and my titrate down after having regular bowel movements 3/12/20   Derrek Lilly MD   tamsulosin [facility]) 0.4 MG capsule Take 1 capsule by mouth daily 3/2/20   Wilda Cole MD   traMADol (ULTRAM) 50 MG tablet Take 50 mg by mouth every 6 hours as needed for Pain. Historical Provider, MD   furosemide (LASIX) 20 MG tablet Take 1 tablet by mouth daily as needed (Swelling, SOB, weight gain) 20   Frantz Weinstein DO   ferrous sulfate 325 (65 Fe) MG tablet Take 1 tablet by mouth 3 times daily (with meals) 20   Neal Celaya       Current medications:    No current facility-administered medications for this visit. No current outpatient medications on file.      Facility-Administered Medications Ordered in Other Visits   Medication Dose Route Frequency Provider Last Rate Last Dose    lactated ringers infusion   Intravenous Continuous Teri Ji  mL/hr at 20 0705         Allergies:  No Known Allergies    Problem List:    Patient Active Problem List   Diagnosis Code    Tobacco use Z72.0    Acute UTI N39.0    Erectile dysfunction N52.9    Anemia D64.9    Hiatal hernia K44.9    Polyp of ascending colon D12.2    Polyp of cecum D12.0    Polyp of transverse colon D12.3    Polyp of sigmoid colon D12.5    Hypospadias Q54.9    Recurrent UTI N39.0       Past Medical History:        Diagnosis Date    Erectile dysfunction     Measles     Mumps     UTI (urinary tract infection)        Past Surgical History:        Procedure Laterality Date    POCCL, POCBUN, POCHEMO, POCHCT in the last 72 hours. Coags: No results found for: PROTIME, INR, APTT    HCG (If Applicable): No results found for: PREGTESTUR, PREGSERUM, HCG, HCGQUANT     ABGs: No results found for: PHART, PO2ART, RAD5MEJ, XSP1HXO, BEART, E5EKVLUI     Type & Screen (If Applicable):  No results found for: [facility]    Anesthesia Evaluation  Patient summary reviewed no history of anesthetic complications:   Airway: Mallampati: II  TM distance: >3 FB   Neck ROM: full  Mouth opening: > = 3 FB Dental: normal exam         Pulmonary:normal exam    (+) current smoker                           Cardiovascular:  Exercise tolerance: good (>4 METS),                     Neuro/Psych:   Negative Neuro/Psych ROS              GI/Hepatic/Renal:   (+) GERD: no interval change, bowel prep,           Endo/Other:    (+) blood dyscrasia: anemia:., .                 Abdominal:           Vascular: negative vascular ROS. Anesthesia Plan      TIVA, general and MAC     ASA 3       Induction: intravenous. Anesthetic plan and risks discussed with patient.       Plan discussed with surgical team.                  TIMI Doll - CRNA   3/18/2020

## 2020-03-18 NOTE — OP NOTE
[facility]  General Surgery      Operative procedure note    DATE OF PROCEDURE: 3/18/2020    SURGEON: Dr. [place] Rim: --    PREOPERATIVE DIAGNOSIS: large polyp of cecum    POSTOPERATIVE DIAGNOSIS: Same, asc col polyp x1, desc col polyp x1, sigmoid diverticulosis, internal hemorrhoids    OPERATION: Diagnostic colonoscopy with hot snare and cold forcep polypectomy with submucosal injection    ANESTHESIA:  MAC    ESTIMATED BLOOD LOSS: None. COMPLICATIONS: None. SPECIMENS: were obtained    INDICATIONS FOR PROCEDURE:   The patient is a 72y.o. year old male with history of above preop diagnosis. Colonoscopy with possible biopsy or polypectomy was recommend, the risk, benefits, expected outcome, and alternatives to the procedure were explained. Risks included but are not limited to bleeding, infection, respiratory distress, hypotension, and perforation of the colon. The patient understands and is in agreement. PROCEDURE DETAILS:  Patient was brought to the endoscopy suite and placed in the left lateral recumbent position. A time out was completed verifying correct patient, procedure and equipment. Anesthesia was induced using MAC guidelines. A digital rectal exam was performed. The colonoscope was inserted into the rectum without difficulty and the scope was advanced to the cecum which was identified by anatomy and by palpation. Bowel prep was adequate. The scope was slowly withdrawn visualizing the cecum, ascending colon, transverse colon, proximal descending or rectosigmoid colon. The scope was withdrawn to the rectal vault and then retroflexed. The scope was then straightened and removed. The patient tolerated the procedure well and was transferred to the recovery unit in stable condition.     Findings:  Cecum/Ascending colon: flat and irregularly shaped polyp of the cecum several centimeters away from the appendiceal orifice, submucosa injected with dilute tattoo ink to raise the mucosa, majority of the polyp was removed in pieces with hot snare, the minimal tissue remaining in the periphery was cauterized with hot snare. 5mm polyp of the proximal ascending colon removed with cold forcep. Transverse colon: normal    Descending/Sigmoid colon: 5mm polyp removed with cold forcep then cauterized. Mild sigmoid diverticulosis    Rectum/Anus: int hemorrhoids without complication    Greater than 6 minutes was spent withdrawing the colonoscope. IMPRESSION/PLAN:   1.  Repeat colonoscopy in one year for surveillance, sooner if blood in the stool, unexplained weight loss, or change in the bowels    Electronically signed by David Crowe DO on 3/18/2020 at 8:38 AM

## 2020-03-18 NOTE — H&P
Denies rashes, ulcers  Musculoskeletal: Denies muscle, joint, back pain. Allergies: Patient has no known allergies. Current Meds:  Current Facility-Administered Medications:     lactated ringers infusion, , Intravenous, Continuous, Brooke Rodríguez DO, Last Rate: 100 mL/hr at 03/18/20 0705    cefOXitin (MEFOXIN) 2 g in dextrose 5% 50 mL (mini-bag), 2 g, Intravenous, On Call to OR, Brooke Rodríguez DO    Vital Signs:  Vitals:    03/18/20 0656   BP: (!) 150/74   Pulse: 72   Resp: 16   Temp: 96.3 °F (35.7 °C)   SpO2: 95%       Physical Exam:  Vital signs and Nurse's note reviewed  Gen:  A&Ox3, NAD  HEENT: NCAT, PERRLA, EOMI, no scleral icterus, oral mucosa moist  Neck: Trachea midline without obvious masses or lesions  Chest: Symmetric rise with inhalation, no evidence of trauma  CVS: S1S2  Resp: Good bilateral air entry, no audible wheeze or rhonchi  Abd: soft, non-tender, non-distended, no hepatosplenomegaly or palpable masses  Ext: No clubbing, cyanosis, edema, peripheral pulses 2+ Rad/Fem/DP/PT  CNS: Moves all extremities, no gross focal motor deficits  Skin: No erythema or ulcerations           Assessment:    William Rangel is a 72 y.o. male with large polyp cecum    Plan:    1.  To OR for colonoscopy, all questions answered, we discussed the risk of colonoscopy including perforation during the retrieval of this polyp, pt agrees to proceed, written informed consent obtained      Brooke Rodríguez  3/18/2020

## 2020-03-19 NOTE — PROGRESS NOTES
Outpatient Physical Therapy    [x] [place]  Phone: [phone]  Fax: [phone]      [] [place]  Phone: [phone]  Fax: [phone]    Physical Therapy  Cancellation/No-show Note  Patient Name:  Yoli George  :  1950   Date:  3/19/2020  Cancelled visits to date: 1  No-shows to date: 0    For today's appointment patient:  [x]  Cancelled  []  Rescheduled appointment  []  No-show     Reason given by patient:  [x]  Patient ill  []  Conflicting appointment  []  No transportation    []  Conflict with work  []  No reason given  []  Other:     Comments:      Electronically signed by:  Romi Gary PTA

## 2020-03-20 NOTE — TELEPHONE ENCOUNTER
Patient called the office and was given pathology. Patient stated that his abdomen was sore/ achy from the scope, \"not really painful just feels like my abdomen is bruised inside\" and is not having any other troubles. Writer notified patient Dr. Lester Hodgson is not in office today and to go to [facility] or ER for evaluation to make sure nothing is wrong. Patient refuses to go get evaluated and will just wait it out. Writer again urged patient to go to ER or UC for evaluation.

## 2020-04-20 NOTE — TELEPHONE ENCOUNTER
Writer called patient as follow up to last rehab session. Patient relates doing well with compliance and understanding with current HEP. Patient given options of holding chart, discharge or virtual visits. Patient relates he will be moving within the next 2 wks and wishes to be discharged at this time. Encouraged patient to call with any questions or concerns. Understanding vocalized.     Hernesto Noguera PTA

## 2020-04-27 NOTE — DISCHARGE SUMMARY
Excellent [x] Good [] Fair  [] Poor     Goal Status:  [x] Achieved [] Partially Achieved  [] Not Achieved       Electronically signed by:  Nora Rivera PT

## 2020-04-28 NOTE — TELEPHONE ENCOUNTER
I called pt to initiate a pre pain procedure visit with Dr. Vamshi Berman. Pt has left the area and is moving to [place] so will not be rescheduling with us at this time.

## 2020-07-10 NOTE — TELEPHONE ENCOUNTER
Confirmed with patient this is the correct pharmacy and he takes 1 tablet twice daily. Last ov 3/16/20 and no upcoming visit.

## 2022-03-29 NOTE — TELEPHONE ENCOUNTER
CLINICAL PHARMACY CONSULTATION: Transition of Care (YORDAN)  -----------------------------------------------------------------------------------------------  Te Mustafa is a 72 y.o. male  who was discharged from [facility] on 2/7/2020 with a diagnosis of acute UTI. Attempt made to contact pt. Left msg on home/mobile TAD requesting call back at [phone] or [phone] option 7. Will continue to attempt to contact pt as appropriate.     Uma Navarro, PharmD  [address] [place] [address],[address] Clinical Pharmacist  O: [phone]  Toll free: [phone], option 7
Declines

## 2022-10-11 NOTE — PLAN OF CARE
Problem: Sensory:  Goal: General experience of comfort will improve  Description  General experience of comfort will improve  2/2/2020 1104 by Sam Lopez RN  Outcome: Ongoing  2/1/2020 2222 by Aurora Beavers RN  Outcome: Ongoing     Problem: Urinary Elimination:  Goal: Signs and symptoms of infection will decrease  Description  Signs and symptoms of infection will decrease  2/2/2020 1104 by Sam Lopez RN  Outcome: Ongoing  2/1/2020 2222 by Aurora Beavers RN  Outcome: Ongoing  Goal: Ability to reestablish a normal urinary elimination pattern will improve - after catheter removal  Description  Ability to reestablish a normal urinary elimination pattern will improve  2/2/2020 1104 by Sam Lopez RN  Outcome: Ongoing  2/1/2020 2222 by Aurora Beavers RN  Outcome: Ongoing  Goal: Complications related to the disease process, condition or treatment will be avoided or minimized  Description  Complications related to the disease process, condition or treatment will be avoided or minimized  2/2/2020 1104 by Sam Lopez RN  Outcome: Ongoing  2/1/2020 2222 by Aurora Beavers RN  Outcome: Ongoing     Problem: Falls - Risk of:  Goal: Will remain free from falls  Description  Will remain free from falls  2/2/2020 1104 by Sam Lopez RN  Outcome: Ongoing  2/1/2020 2222 by Aurora Beavers RN  Outcome: Ongoing  Goal: Absence of physical injury  Description  Absence of physical injury  2/2/2020 1104 by Sam Lopez RN  Outcome: Ongoing  2/1/2020 2222 by Aurora Beavers RN  Outcome: Ongoing     Problem: Discharge Planning:  Goal: Discharged to appropriate level of care  Description  Discharged to appropriate level of care  2/2/2020 1104 by Sam Lopez RN  Outcome: Ongoing  2/1/2020 2222 by Aurora Beavers RN  Outcome: Ongoing Total Volume Injected (Ccs- Only Use Numbers And Decimals): 0.20

## (undated) DEVICE — JELLY LUBRICATING 4OZ FLIP TOP TB E Z

## (undated) DEVICE — 9165 UNIVERSAL PATIENT PLATE: Brand: 3M™

## (undated) DEVICE — SNARE ENDOSCP L240CM SHTH DIA2.4MM LOOP W20MM MIN WRK CHN

## (undated) DEVICE — CATHETER URETH 18FR BLLN 5CC STD LTX 2 W F TWO OPP DRNGE

## (undated) DEVICE — Z INACTIVE USE 2660664 SOLUTION IRRIG 3000ML 0.9% SOD CHL USP UROMATIC PLAS CONT

## (undated) DEVICE — TRAP SURG QUAD PARABOLA SLOT DSGN SFTY SCRN TRAPEASE

## (undated) DEVICE — Device: Brand: SPOT EX ENDOSCOPIC TATTOO

## (undated) DEVICE — CANNULA NSL AD L2IN ETCO2 SAMP SFT CRUSH RESIST FEM AIRLFE

## (undated) DEVICE — BITE BLOCK W/VELCRO STRAP

## (undated) DEVICE — ADAPTER URO SCP UROLOK LL

## (undated) DEVICE — 60 ML SYRINGE REGULAR TIP: Brand: MONOJECT

## (undated) DEVICE — GLOVE ORANGE PI 7   MSG9070

## (undated) DEVICE — NEEDLE CATH INJ 25GAX200CM LEN

## (undated) DEVICE — FORCEPS BX L240CM JAW DIA2.4MM ORNG L CAP W/ NDL DISP RAD

## (undated) DEVICE — CYSTO/BLADDER IRRIGATION SET, REGULATING CLAMP

## (undated) DEVICE — SOLUTION IV IRRIG WATER 1000ML POUR BRL 2F7114

## (undated) DEVICE — POUCH DRNGE FLX BND INTEGR RAIL CLMP DISP EZ CTCH

## (undated) DEVICE — Z DUP USE 2565107 PACK SURG PROC LEG CYSTO T-DRAPE REINF TBL CVR HND TWL

## (undated) DEVICE — SOLUTION SCRB 4OZ 4% CHG H2O AIDED FOR PREOPERATIVE SKIN

## (undated) DEVICE — 1200CC GUARDIAN II: Brand: GUARDIAN

## (undated) DEVICE — MERCY DEFIANCE ENDO KIT: Brand: MEDLINE INDUSTRIES, INC.

## (undated) DEVICE — GUIDEWIRE URO L150CM DIA0.035IN STIFF NIT HYDRPHLC STR TIP

## (undated) DEVICE — GLOVE ORANGE PI 7 1/2   MSG9075

## (undated) DEVICE — S-CURVE URETHRAL DILATOR SET WITH AQ, HYDROPHILIC COATING: Brand: S~CURVE

## (undated) DEVICE — FORCEPS BX L240CM JAW DIA2.2MM RAD JAW 4 HOT DISP